# Patient Record
Sex: MALE | Race: BLACK OR AFRICAN AMERICAN | NOT HISPANIC OR LATINO | Employment: FULL TIME | URBAN - METROPOLITAN AREA
[De-identification: names, ages, dates, MRNs, and addresses within clinical notes are randomized per-mention and may not be internally consistent; named-entity substitution may affect disease eponyms.]

---

## 2019-12-09 ENCOUNTER — APPOINTMENT (OUTPATIENT)
Dept: CT IMAGING | Facility: HOSPITAL | Age: 29
End: 2019-12-09

## 2019-12-09 ENCOUNTER — HOSPITAL ENCOUNTER (EMERGENCY)
Facility: HOSPITAL | Age: 29
Discharge: HOME | End: 2019-12-09
Attending: EMERGENCY MEDICINE | Admitting: EMERGENCY MEDICINE

## 2019-12-09 VITALS
DIASTOLIC BLOOD PRESSURE: 74 MMHG | HEIGHT: 66 IN | HEART RATE: 79 BPM | OXYGEN SATURATION: 92 % | RESPIRATION RATE: 18 BRPM | SYSTOLIC BLOOD PRESSURE: 122 MMHG | TEMPERATURE: 97.2 F

## 2019-12-09 DIAGNOSIS — R51.9 ACUTE NONINTRACTABLE HEADACHE, UNSPECIFIED HEADACHE TYPE: Primary | ICD-10-CM

## 2019-12-09 PROCEDURE — 70450 CT HEAD/BRAIN W/O DYE: CPT

## 2019-12-09 PROCEDURE — 99283 EMERGENCY DEPT VISIT LOW MDM: CPT

## 2019-12-09 RX ORDER — ONDANSETRON 4 MG/1
4 TABLET, ORALLY DISINTEGRATING ORAL ONCE
Status: DISCONTINUED | OUTPATIENT
Start: 2019-12-09 | End: 2019-12-09 | Stop reason: HOSPADM

## 2019-12-09 NOTE — ED PROVIDER NOTES
EMERGENCY DEPARTMENT ENCOUNTER    CHIEF COMPLAINT  Chief Complaint: HA  History given by: pt  History limited by: nothing  Time Seen: 1217  Room Number: 41/41  PMD: Provider, No Known      HPI:  Pt is a 29 y.o. male who presents with c/o intermittent, worsening, severe headache, that is concentrated in the back of his head and behind his eyes, starting 3 days ago.  Patient also complains of fatigue.  Patient denies cough, fever, neck pain, neck stiffness, nausea, vomiting, or photophobia. Patient had some relief with Tylenol. Also denies ever having HA's like this before. Pt was seen at [facility] earlier today, and instructed to come to ED for further evaluation.  Past Medical History of head injury 4 years ago.    PAST MEDICAL HISTORY  Active Ambulatory Problems     Diagnosis Date Noted   • No Active Ambulatory Problems     Resolved Ambulatory Problems     Diagnosis Date Noted   • No Resolved Ambulatory Problems     No Additional Past Medical History       PAST SURGICAL HISTORY  No past surgical history on file.    FAMILY HISTORY  No family history on file.    SOCIAL HISTORY  Social History     Socioeconomic History   • Marital status: Single     Spouse name: Not on file   • Number of children: Not on file   • Years of education: Not on file   • Highest education level: Not on file         ALLERGIES  Patient has no known allergies.    REVIEW OF SYSTEMS  Review of Systems   Constitutional: Positive for fatigue. Negative for chills and fever.   HENT: Negative for sore throat.    Eyes: Negative for photophobia.   Respiratory: Negative for cough and shortness of breath.    Cardiovascular: Negative for chest pain.   Gastrointestinal: Negative for nausea and vomiting.   Genitourinary: Negative for dysuria.   Musculoskeletal: Negative for back pain, neck pain and neck stiffness.   Skin: Negative for rash.   Neurological: Positive for headaches. Negative for dizziness.   Psychiatric/Behavioral: The patient is not  nervous/anxious.        PHYSICAL EXAM  ED Triage Vitals   Temp Heart Rate Resp BP SpO2   12/09/19 1101 12/09/19 1101 12/09/19 1101 12/09/19 1134 12/09/19 1101   96.9 °F (36.1 °C) 86 18 131/76 96 %       Physical Exam   Constitutional: He is well-developed, well-nourished, and in no distress. No distress.   HENT:   Head: Atraumatic.   Mouth/Throat: Mucous membranes are normal.   No maxillary or sinus tenderness   Eyes: Pupils are equal, round, and reactive to light. No scleral icterus.   Neck: Normal range of motion.   Cardiovascular: Normal rate, regular rhythm and normal heart sounds.   Pulmonary/Chest: Effort normal and breath sounds normal.   Musculoskeletal: Normal range of motion.   Neurological: He is alert. He has normal strength and normal reflexes. He displays no weakness.   Skin: Skin is warm and dry.   Psychiatric: Mood and affect normal.   Nursing note and vitals reviewed.    RADIOLOGY  CT Head Without Contrast   Preliminary Result   1. No acute intracranial process identified.   2. Maxillary sinusitis.               Radiation dose reduction techniques were utilized, including automated   exposure control and exposure modulation based on body size.                  I ordered the above noted radiological studies and reviewed the images on the PACS system.      MEDICAL RECORD REVIEW    Pt was seen at [facility] prior to arrival here today, for further evaluation of sudden onset severe headache.    PROGRESS AND CONSULTS     1218- HR 82. O2 sat 98% on RA. /76.  Discussed with patient and family plan to obtain imaging of head. He declines pain medicine at this time. Pt understands and agrees with the plan, all questions answered. Ordered CT Head for further evaluation.     1333- Reviewed pt's history and workup with Dr. Trotter.  At bedside evaluation, they agree with the plan of care. He discussed with pt plan for discharge home.    Reviewed implications of results, diagnosis, meds, responsibility to  "follow up, warning signs and symptoms of possible worsening, potential complications and reasons to return to ER with patient.  Discussed all results and noted any abnormalities with patient.  Discussed absolute need to recheck abnormalities with PCP.    Discussed plan for discharge, as there is no emergent indication for admission.  Pt is agreeable and understands need for follow up and repeat testing.  Pt is aware that discharge does not mean that nothing is wrong but it indicates no emergency is present.  Pt is discharged with instructions to follow up with primary care doctor to have their blood pressure rechecked.       DIAGNOSIS  Final diagnoses:   Acute nonintractable headache, unspecified headache type       FOLLOW UP   PATIENT LIAISON [facility] [id number]  [phone]  Schedule an appointment as soon as possible for a visit   As needed        COURSE & MEDICAL DECISION MAKING  Pertinent Imaging studies that were ordered and reviewed are noted above.  Results were reviewed/discussed with the patient and they were also made aware of online assess.     MEDICATIONS GIVEN IN ER  Medications - No data to display    /89   Pulse 79   Temp 96.9 °F (36.1 °C) (Tympanic)   Resp 16   Ht 167.6 cm (66\")   SpO2 95%       I personally reviewed the past medical history, past surgical history, social history, family history, current medications and allergies as they appear in this chart.  The scribe's note accurately reflects the work and decisions made by me.     Documentation assistance provided by sai Judd for ZULEMA Thornton on 12/9/2019 at 12:12 PM. Information recorded by the scribe was done at my direction and has been verified and validated by me.     Shayla Judd  12/09/19 5326       Bertha Montoya APRN  12/09/19 1711    "

## 2019-12-09 NOTE — DISCHARGE INSTRUCTIONS
Tylenol or Motrin as needed for headache  Rest and increase fluids  Follow up with pmd or clinic of choice as needed  Return to er for fever, chills, vomiting, visual changes, worsening headache or any new or worsening symptoms

## 2019-12-09 NOTE — ED NOTES
Pt was at [facility] today for headache x 2 days and was told to come to ER for CT.      Sandra Flores RN  12/09/19 3458

## 2019-12-10 NOTE — ED PROVIDER NOTES
MD ATTESTATION NOTE    The DEMIAN and I have discussed this patient's history, physical exam, and treatment plan.  I have reviewed the documentation and personally had a face to face interaction with the patient. I affirm the documentation and agree with the treatment and plan.  The attached note describes my personal findings.      Zeeshan Gardner is a 29 y.o. male who presents to the ED c/o headache.  It is a frontal headache that radiates across the top of his head to the back.  Is been ongoing for 3 days.  It is pressure in character.  Denies any fever.  No photophobia or phonophobia.  He states this is a different headache for him.  It was gradual in onset.      On exam:  Recent and remote memory functions are normal. The patient is attentive with normal concentration. Language is fluent. Speech is clear. The speech is non-dysarthric. Fund of knowledge is normal.   Symmetric smile with no facial droop.  Eyes close shut strongly bilaterally.  Symmetric eyebrow raise bilaterally.  EOMI, PERRL  CN II-XII grossly normal otherwise.  5/5 strength to extremities.  No pronator drift.  Intact FNF.  No meningismus.         Labs  No results found for this or any previous visit (from the past 24 hour(s)).    Radiology  Ct Head Without Contrast    Result Date: 12/10/2019  CT OF THE BRAIN WITHOUT CONTRAST 12/09/2019  HISTORY: Headache.  Axial images were obtained through the brain without intravenous contrast.  The brain parenchyma and ventricular system appear within normal limits. No mass lesions, midline shift, intracranial hemorrhage or evidence of infarction is demonstrated.  There is mucosal thickening in the left maxillary sinus and very minimal mucosal thickening in the right maxillary sinus.  No bony abnormalities are seen.      1. No acute intracranial process identified. 2. Maxillary sinusitis.    Radiation dose reduction techniques were utilized, including automated exposure control and exposure modulation based on  body size.  This report was finalized on 12/10/2019 7:44 AM by Dr. Man Kim M.D.        Medical Decision Making:       Patient is in bed resting comfortably interacting with his family.  This does not appear to be consistent with a subarachnoid hemorrhage as it was not sudden onset and it is not the worst headache of his life.  However, he does state that it is different for him.  CT head is negative.  I do not believe that lumbar puncture is clinically indicated at this time in this patient as the risks would likely outweigh the likelihood of finding anything.  Additionally, low suspicion for meningitis in this patient as he has no meningismus and is afebrile.  He states that his headache is much improved.  I gave him good return precautions.  He will take Tylenol and ibuprofen over-the-counter.    Diagnosis  Final diagnoses:   Acute nonintractable headache, unspecified headache type        Inderjit Trotter II, MD  12/10/19 0755

## 2020-08-21 ENCOUNTER — OFFICE VISIT (OUTPATIENT)
Dept: FAMILY MEDICINE CLINIC | Facility: CLINIC | Age: 30
End: 2020-08-21

## 2020-08-21 VITALS
BODY MASS INDEX: 30.08 KG/M2 | DIASTOLIC BLOOD PRESSURE: 82 MMHG | SYSTOLIC BLOOD PRESSURE: 132 MMHG | WEIGHT: 187.2 LBS | RESPIRATION RATE: 16 BRPM | TEMPERATURE: 97.7 F | HEART RATE: 100 BPM | OXYGEN SATURATION: 97 % | HEIGHT: 66 IN

## 2020-08-21 DIAGNOSIS — Z00.00 ANNUAL PHYSICAL EXAM: Primary | ICD-10-CM

## 2020-08-21 DIAGNOSIS — G47.9 SLEEP DISTURBANCE: ICD-10-CM

## 2020-08-21 DIAGNOSIS — B36.0 PITYRIASIS VERSICOLOR: ICD-10-CM

## 2020-08-21 DIAGNOSIS — S46.819A STRAIN OF TRAPEZIUS MUSCLE, UNSPECIFIED LATERALITY, INITIAL ENCOUNTER: ICD-10-CM

## 2020-08-21 PROCEDURE — 99385 PREV VISIT NEW AGE 18-39: CPT | Performed by: NURSE PRACTITIONER

## 2020-08-21 RX ORDER — MELOXICAM 7.5 MG/1
7.5 TABLET ORAL DAILY
Qty: 14 TABLET | Refills: 0 | Status: SHIPPED | OUTPATIENT
Start: 2020-08-21

## 2020-08-21 RX ORDER — NYSTATIN AND TRIAMCINOLONE ACETONIDE 100000; 1 [USP'U]/G; MG/G
OINTMENT TOPICAL 2 TIMES DAILY
Qty: 1 BOTTLE | Refills: 1 | Status: SHIPPED | OUTPATIENT
Start: 2020-08-21

## 2020-08-21 NOTE — PATIENT INSTRUCTIONS
Increase water intake--goal 64 oz a day  Continue to eat heart healthy diet, increase fiber intake  Increase exercise to 3-5 times a week, for more than 30 minutes at a time  Need to make an appt and see optometrist for vision screening    Sleep-melatonin 5 mg every night    Trapezius strain: May use cold compress/ice pack 10-15 minutes at a time several times a day; May use warm compress/heating pad 10-15 minutes at at time several times a day;  May use over the counter biofreeze as needed (wash off from skin before using heating pad;  May use meloxicam script to help pain
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
## 2020-08-27 DIAGNOSIS — Z13.220 ENCOUNTER FOR SCREENING FOR LIPOID DISORDERS: ICD-10-CM

## 2020-08-27 DIAGNOSIS — Z13.89 ENCOUNTER FOR SCREENING FOR OTHER DISORDER: Primary | ICD-10-CM

## 2020-08-27 DIAGNOSIS — Z11.59 NEED FOR HEPATITIS C SCREENING TEST: ICD-10-CM

## 2020-08-27 DIAGNOSIS — Z00.00 ROUTINE HEALTH MAINTENANCE: ICD-10-CM

## 2020-09-14 LAB
ALBUMIN SERPL-MCNC: 4.6 G/DL (ref 3.5–5.2)
ALBUMIN/GLOB SERPL: 1.6 G/DL
ALP SERPL-CCNC: 77 U/L (ref 39–117)
ALT SERPL-CCNC: 29 U/L (ref 1–41)
APPEARANCE UR: CLEAR
AST SERPL-CCNC: 21 U/L (ref 1–40)
BILIRUB SERPL-MCNC: 0.4 MG/DL (ref 0–1.2)
BILIRUB UR QL STRIP: NEGATIVE
BUN SERPL-MCNC: 12 MG/DL (ref 6–20)
BUN/CREAT SERPL: 13.5 (ref 7–25)
CALCIUM SERPL-MCNC: 8.9 MG/DL (ref 8.6–10.5)
CHLORIDE SERPL-SCNC: 106 MMOL/L (ref 98–107)
CHOLEST SERPL-MCNC: 209 MG/DL (ref 0–200)
CO2 SERPL-SCNC: 24 MMOL/L (ref 22–29)
COLOR UR: YELLOW
CREAT SERPL-MCNC: 0.89 MG/DL (ref 0.76–1.27)
DIFFERENTIAL COMMENT: ABNORMAL
EOSINOPHIL # BLD MANUAL: 0.09 10*3/MM3 (ref 0–0.4)
EOSINOPHIL NFR BLD MANUAL: 2 % (ref 0.3–6.2)
ERYTHROCYTE [DISTWIDTH] IN BLOOD BY AUTOMATED COUNT: 12.4 % (ref 12.3–15.4)
GLOBULIN SER CALC-MCNC: 2.8 GM/DL
GLUCOSE SERPL-MCNC: 101 MG/DL (ref 65–99)
GLUCOSE UR QL: NEGATIVE
HCT VFR BLD AUTO: 42.3 % (ref 37.5–51)
HCV AB S/CO SERPL IA: 0.2 S/CO RATIO (ref 0–0.9)
HCV AB SERPL QL IA: NORMAL
HDLC SERPL-MCNC: 59 MG/DL (ref 40–60)
HGB BLD-MCNC: 13.9 G/DL (ref 13–17.7)
HGB UR QL STRIP: NEGATIVE
KETONES UR QL STRIP: NEGATIVE
LDLC SERPL CALC-MCNC: 135 MG/DL (ref 0–100)
LDLC/HDLC SERPL: 2.28 {RATIO}
LEUKOCYTE ESTERASE UR QL STRIP: NEGATIVE
LYMPHOCYTES # BLD MANUAL: 2.12 10*3/MM3 (ref 0.7–3.1)
LYMPHOCYTES NFR BLD MANUAL: 45.9 % (ref 19.6–45.3)
MCH RBC QN AUTO: 29.3 PG (ref 26.6–33)
MCHC RBC AUTO-ENTMCNC: 32.9 G/DL (ref 31.5–35.7)
MCV RBC AUTO: 89.2 FL (ref 79–97)
MONOCYTES # BLD MANUAL: 0.61 10*3/MM3 (ref 0.1–0.9)
MONOCYTES NFR BLD MANUAL: 13.3 % (ref 5–12)
NEUTROPHILS # BLD MANUAL: 1.79 10*3/MM3 (ref 1.7–7)
NEUTROPHILS NFR BLD MANUAL: 38.8 % (ref 42.7–76)
NITRITE UR QL STRIP: NEGATIVE
NRBC BLD AUTO-RTO: 0 /100 WBC (ref 0–0.2)
PH UR STRIP: 5.5 [PH] (ref 5–8)
PLATELET # BLD AUTO: 152 10*3/MM3 (ref 140–450)
PLATELET BLD QL SMEAR: ABNORMAL
POTASSIUM SERPL-SCNC: 4.2 MMOL/L (ref 3.5–5.2)
PROT SERPL-MCNC: 7.4 G/DL (ref 6–8.5)
PROT UR QL STRIP: NEGATIVE
RBC # BLD AUTO: 4.74 10*6/MM3 (ref 4.14–5.8)
RBC MORPH BLD: ABNORMAL
SODIUM SERPL-SCNC: 136 MMOL/L (ref 136–145)
SP GR UR: 1.02 (ref 1–1.03)
TRIGL SERPL-MCNC: 77 MG/DL (ref 0–150)
UROBILINOGEN UR STRIP-MCNC: NORMAL MG/DL
VLDLC SERPL CALC-MCNC: 15.4 MG/DL
WBC # BLD AUTO: 4.62 10*3/MM3 (ref 3.4–10.8)

## 2022-03-01 ENCOUNTER — APPOINTMENT (OUTPATIENT)
Dept: GENERAL RADIOLOGY | Facility: HOSPITAL | Age: 32
End: 2022-03-01

## 2022-03-01 ENCOUNTER — HOSPITAL ENCOUNTER (OUTPATIENT)
Facility: HOSPITAL | Age: 32
Setting detail: OBSERVATION
Discharge: HOME | End: 2022-03-07
Attending: EMERGENCY MEDICINE | Admitting: INTERNAL MEDICINE

## 2022-03-01 DIAGNOSIS — Z86.19 HISTORY OF VIRAL HEPATITIS: ICD-10-CM

## 2022-03-01 DIAGNOSIS — D69.6 THROMBOCYTOPENIA: ICD-10-CM

## 2022-03-01 DIAGNOSIS — R74.01 TRANSAMINITIS: Primary | ICD-10-CM

## 2022-03-01 LAB
ALBUMIN SERPL-MCNC: 4 G/DL (ref 3.5–5.2)
ALBUMIN/GLOB SERPL: 1.1 G/DL
ALP SERPL-CCNC: 189 U/L (ref 39–117)
ALT SERPL W P-5'-P-CCNC: 743 U/L (ref 1–41)
ANION GAP SERPL CALCULATED.3IONS-SCNC: 9.5 MMOL/L (ref 5–15)
AST SERPL-CCNC: 562 U/L (ref 1–40)
BASOPHILS # BLD AUTO: 0.01 10*3/MM3 (ref 0–0.2)
BASOPHILS NFR BLD AUTO: 0.2 % (ref 0–1.5)
BILIRUB SERPL-MCNC: 1 MG/DL (ref 0–1.2)
BILIRUB UR QL STRIP: NEGATIVE
BUN SERPL-MCNC: 11 MG/DL (ref 6–20)
BUN/CREAT SERPL: 11.3 (ref 7–25)
CALCIUM SPEC-SCNC: 9 MG/DL (ref 8.6–10.5)
CHLORIDE SERPL-SCNC: 104 MMOL/L (ref 98–107)
CLARITY UR: CLEAR
CO2 SERPL-SCNC: 23.5 MMOL/L (ref 22–29)
COLOR UR: YELLOW
CREAT SERPL-MCNC: 0.97 MG/DL (ref 0.76–1.27)
DEPRECATED RDW RBC AUTO: 41.7 FL (ref 37–54)
EGFRCR SERPLBLD CKD-EPI 2021: 105.7 ML/MIN/1.73
EOSINOPHIL # BLD AUTO: 0.07 10*3/MM3 (ref 0–0.4)
EOSINOPHIL NFR BLD AUTO: 1.6 % (ref 0.3–6.2)
ERYTHROCYTE [DISTWIDTH] IN BLOOD BY AUTOMATED COUNT: 13 % (ref 12.3–15.4)
GLOBULIN UR ELPH-MCNC: 3.5 GM/DL
GLUCOSE SERPL-MCNC: 120 MG/DL (ref 65–99)
GLUCOSE UR STRIP-MCNC: NEGATIVE MG/DL
HCT VFR BLD AUTO: 41.5 % (ref 37.5–51)
HGB BLD-MCNC: 14 G/DL (ref 13–17.7)
HGB UR QL STRIP.AUTO: NEGATIVE
KETONES UR QL STRIP: NEGATIVE
LEUKOCYTE ESTERASE UR QL STRIP.AUTO: NEGATIVE
LYMPHOCYTES # BLD AUTO: 2.27 10*3/MM3 (ref 0.7–3.1)
LYMPHOCYTES NFR BLD AUTO: 50.7 % (ref 19.6–45.3)
MCH RBC QN AUTO: 29.9 PG (ref 26.6–33)
MCHC RBC AUTO-ENTMCNC: 33.7 G/DL (ref 31.5–35.7)
MCV RBC AUTO: 88.5 FL (ref 79–97)
MONOCYTES # BLD AUTO: 0.69 10*3/MM3 (ref 0.1–0.9)
MONOCYTES NFR BLD AUTO: 15.4 % (ref 5–12)
NEUTROPHILS NFR BLD AUTO: 1.43 10*3/MM3 (ref 1.7–7)
NEUTROPHILS NFR BLD AUTO: 31.9 % (ref 42.7–76)
NITRITE UR QL STRIP: NEGATIVE
PH UR STRIP.AUTO: 6.5 [PH] (ref 5–8)
PLATELET # BLD AUTO: 130 10*3/MM3 (ref 140–450)
PMV BLD AUTO: 11.9 FL (ref 6–12)
POTASSIUM SERPL-SCNC: 3.7 MMOL/L (ref 3.5–5.2)
PROT SERPL-MCNC: 7.5 G/DL (ref 6–8.5)
PROT UR QL STRIP: NEGATIVE
RBC # BLD AUTO: 4.69 10*6/MM3 (ref 4.14–5.8)
SARS-COV-2 RNA PNL SPEC NAA+PROBE: NOT DETECTED
SODIUM SERPL-SCNC: 137 MMOL/L (ref 136–145)
SP GR UR STRIP: 1.01 (ref 1–1.03)
TROPONIN T SERPL-MCNC: <0.01 NG/ML (ref 0–0.03)
UROBILINOGEN UR QL STRIP: NORMAL
WBC NRBC COR # BLD: 4.48 10*3/MM3 (ref 3.4–10.8)

## 2022-03-01 PROCEDURE — 80074 ACUTE HEPATITIS PANEL: CPT | Performed by: EMERGENCY MEDICINE

## 2022-03-01 PROCEDURE — 93005 ELECTROCARDIOGRAM TRACING: CPT | Performed by: PHYSICIAN ASSISTANT

## 2022-03-01 PROCEDURE — 25010000002 ONDANSETRON PER 1 MG: Performed by: PHYSICIAN ASSISTANT

## 2022-03-01 PROCEDURE — 85025 COMPLETE CBC W/AUTO DIFF WBC: CPT | Performed by: PHYSICIAN ASSISTANT

## 2022-03-01 PROCEDURE — C9803 HOPD COVID-19 SPEC COLLECT: HCPCS

## 2022-03-01 PROCEDURE — 80143 DRUG ASSAY ACETAMINOPHEN: CPT | Performed by: EMERGENCY MEDICINE

## 2022-03-01 PROCEDURE — 71045 X-RAY EXAM CHEST 1 VIEW: CPT

## 2022-03-01 PROCEDURE — 96375 TX/PRO/DX INJ NEW DRUG ADDON: CPT

## 2022-03-01 PROCEDURE — 99284 EMERGENCY DEPT VISIT MOD MDM: CPT

## 2022-03-01 PROCEDURE — 87635 SARS-COV-2 COVID-19 AMP PRB: CPT | Performed by: PHYSICIAN ASSISTANT

## 2022-03-01 PROCEDURE — 84484 ASSAY OF TROPONIN QUANT: CPT | Performed by: PHYSICIAN ASSISTANT

## 2022-03-01 PROCEDURE — 96374 THER/PROPH/DIAG INJ IV PUSH: CPT

## 2022-03-01 PROCEDURE — 80053 COMPREHEN METABOLIC PANEL: CPT | Performed by: PHYSICIAN ASSISTANT

## 2022-03-01 PROCEDURE — 25010000002 KETOROLAC TROMETHAMINE PER 15 MG: Performed by: PHYSICIAN ASSISTANT

## 2022-03-01 PROCEDURE — 93010 ELECTROCARDIOGRAM REPORT: CPT | Performed by: INTERNAL MEDICINE

## 2022-03-01 PROCEDURE — 85610 PROTHROMBIN TIME: CPT | Performed by: EMERGENCY MEDICINE

## 2022-03-01 PROCEDURE — 86140 C-REACTIVE PROTEIN: CPT | Performed by: PHYSICIAN ASSISTANT

## 2022-03-01 PROCEDURE — 81003 URINALYSIS AUTO W/O SCOPE: CPT | Performed by: PHYSICIAN ASSISTANT

## 2022-03-01 RX ORDER — SODIUM CHLORIDE 0.9 % (FLUSH) 0.9 %
10 SYRINGE (ML) INJECTION AS NEEDED
Status: DISCONTINUED | OUTPATIENT
Start: 2022-03-01 | End: 2022-03-07 | Stop reason: HOSPADM

## 2022-03-01 RX ORDER — KETOROLAC TROMETHAMINE 15 MG/ML
15 INJECTION, SOLUTION INTRAMUSCULAR; INTRAVENOUS ONCE
Status: COMPLETED | OUTPATIENT
Start: 2022-03-01 | End: 2022-03-01

## 2022-03-01 RX ORDER — ONDANSETRON 2 MG/ML
4 INJECTION INTRAMUSCULAR; INTRAVENOUS ONCE
Status: COMPLETED | OUTPATIENT
Start: 2022-03-01 | End: 2022-03-01

## 2022-03-01 RX ADMIN — SODIUM CHLORIDE 1000 ML: 9 INJECTION, SOLUTION INTRAVENOUS at 22:30

## 2022-03-01 RX ADMIN — ONDANSETRON 4 MG: 2 INJECTION INTRAMUSCULAR; INTRAVENOUS at 22:09

## 2022-03-01 RX ADMIN — KETOROLAC TROMETHAMINE 15 MG: 15 INJECTION, SOLUTION INTRAMUSCULAR; INTRAVENOUS at 22:09

## 2022-03-02 ENCOUNTER — APPOINTMENT (OUTPATIENT)
Dept: CT IMAGING | Facility: HOSPITAL | Age: 32
End: 2022-03-02

## 2022-03-02 PROBLEM — D69.6 THROMBOCYTOPENIA (HCC): Status: ACTIVE | Noted: 2022-03-02

## 2022-03-02 PROBLEM — Z86.19 HISTORY OF VIRAL HEPATITIS: Status: ACTIVE | Noted: 2022-03-02

## 2022-03-02 PROBLEM — R74.01 TRANSAMINITIS: Status: ACTIVE | Noted: 2022-03-02

## 2022-03-02 LAB
ADV 40+41 DNA STL QL NAA+NON-PROBE: NOT DETECTED
ALBUMIN SERPL-MCNC: 3.6 G/DL (ref 3.5–5.2)
ALBUMIN/GLOB SERPL: 0.9 G/DL
ALP SERPL-CCNC: 182 U/L (ref 39–117)
ALT SERPL W P-5'-P-CCNC: 756 U/L (ref 1–41)
ANION GAP SERPL CALCULATED.3IONS-SCNC: 10.3 MMOL/L (ref 5–15)
APAP SERPL-MCNC: <5 MCG/ML (ref 0–30)
AST SERPL-CCNC: 567 U/L (ref 1–40)
ASTRO TYP 1-8 RNA STL QL NAA+NON-PROBE: NOT DETECTED
BASOPHILS # BLD AUTO: 0.01 10*3/MM3 (ref 0–0.2)
BASOPHILS NFR BLD AUTO: 0.3 % (ref 0–1.5)
BILIRUB SERPL-MCNC: 1.5 MG/DL (ref 0–1.2)
BUN SERPL-MCNC: 8 MG/DL (ref 6–20)
BUN/CREAT SERPL: 9.5 (ref 7–25)
C CAYETANENSIS DNA STL QL NAA+NON-PROBE: NOT DETECTED
C COLI+JEJ+UPSA DNA STL QL NAA+NON-PROBE: NOT DETECTED
CALCIUM SPEC-SCNC: 9 MG/DL (ref 8.6–10.5)
CHLORIDE SERPL-SCNC: 103 MMOL/L (ref 98–107)
CO2 SERPL-SCNC: 22.7 MMOL/L (ref 22–29)
CREAT SERPL-MCNC: 0.84 MG/DL (ref 0.76–1.27)
CRP SERPL-MCNC: 1.73 MG/DL (ref 0–0.5)
CRYPTOSP DNA STL QL NAA+NON-PROBE: NOT DETECTED
DEPRECATED RDW RBC AUTO: 42.8 FL (ref 37–54)
E HISTOLYT DNA STL QL NAA+NON-PROBE: NOT DETECTED
EAEC PAA PLAS AGGR+AATA ST NAA+NON-PRB: NOT DETECTED
EC STX1+STX2 GENES STL QL NAA+NON-PROBE: NOT DETECTED
EGFRCR SERPLBLD CKD-EPI 2021: 118.1 ML/MIN/1.73
EOSINOPHIL # BLD AUTO: 0.04 10*3/MM3 (ref 0–0.4)
EOSINOPHIL NFR BLD AUTO: 1.1 % (ref 0.3–6.2)
EPEC EAE GENE STL QL NAA+NON-PROBE: DETECTED
ERYTHROCYTE [DISTWIDTH] IN BLOOD BY AUTOMATED COUNT: 13.2 % (ref 12.3–15.4)
ETEC LTA+ST1A+ST1B TOX ST NAA+NON-PROBE: NOT DETECTED
G LAMBLIA DNA STL QL NAA+NON-PROBE: NOT DETECTED
GLOBULIN UR ELPH-MCNC: 3.9 GM/DL
GLUCOSE SERPL-MCNC: 101 MG/DL (ref 65–99)
HAV IGM SERPL QL IA: ABNORMAL
HBA1C MFR BLD: 5.8 % (ref 4.8–5.6)
HBV CORE IGM SERPL QL IA: REACTIVE
HBV SURFACE AG SERPL QL IA: ABNORMAL
HCT VFR BLD AUTO: 41.9 % (ref 37.5–51)
HCV AB SER DONR QL: ABNORMAL
HGB BLD-MCNC: 13.8 G/DL (ref 13–17.7)
HIV1+2 AB SER QL: NORMAL
INR PPP: 1 (ref 0.9–1.1)
INR PPP: 1.01 (ref 0.9–1.1)
LYMPHOCYTES # BLD AUTO: 1.53 10*3/MM3 (ref 0.7–3.1)
LYMPHOCYTES NFR BLD AUTO: 43.7 % (ref 19.6–45.3)
MCH RBC QN AUTO: 29.4 PG (ref 26.6–33)
MCHC RBC AUTO-ENTMCNC: 32.9 G/DL (ref 31.5–35.7)
MCV RBC AUTO: 89.3 FL (ref 79–97)
MONOCYTES # BLD AUTO: 0.64 10*3/MM3 (ref 0.1–0.9)
MONOCYTES NFR BLD AUTO: 18.3 % (ref 5–12)
NEUTROPHILS NFR BLD AUTO: 1.27 10*3/MM3 (ref 1.7–7)
NEUTROPHILS NFR BLD AUTO: 36.3 % (ref 42.7–76)
NOROVIRUS GI+II RNA STL QL NAA+NON-PROBE: NOT DETECTED
P SHIGELLOIDES DNA STL QL NAA+NON-PROBE: NOT DETECTED
PLATELET # BLD AUTO: 118 10*3/MM3 (ref 140–450)
PMV BLD AUTO: 11.8 FL (ref 6–12)
POTASSIUM SERPL-SCNC: 4.1 MMOL/L (ref 3.5–5.2)
PROT SERPL-MCNC: 7.5 G/DL (ref 6–8.5)
PROTHROMBIN TIME: 13.1 SECONDS (ref 11.7–14.2)
PROTHROMBIN TIME: 13.2 SECONDS (ref 11.7–14.2)
QT INTERVAL: 392 MS
RBC # BLD AUTO: 4.69 10*6/MM3 (ref 4.14–5.8)
RVA RNA STL QL NAA+NON-PROBE: NOT DETECTED
S ENT+BONG DNA STL QL NAA+NON-PROBE: NOT DETECTED
SAPO I+II+IV+V RNA STL QL NAA+NON-PROBE: NOT DETECTED
SHIGELLA SP+EIEC IPAH ST NAA+NON-PROBE: NOT DETECTED
SODIUM SERPL-SCNC: 136 MMOL/L (ref 136–145)
V CHOL+PARA+VUL DNA STL QL NAA+NON-PROBE: NOT DETECTED
V CHOLERAE DNA STL QL NAA+NON-PROBE: NOT DETECTED
WBC NRBC COR # BLD: 3.5 10*3/MM3 (ref 3.4–10.8)
Y ENTEROCOL DNA STL QL NAA+NON-PROBE: NOT DETECTED

## 2022-03-02 PROCEDURE — 74177 CT ABD & PELVIS W/CONTRAST: CPT

## 2022-03-02 PROCEDURE — 83036 HEMOGLOBIN GLYCOSYLATED A1C: CPT | Performed by: NURSE PRACTITIONER

## 2022-03-02 PROCEDURE — 96361 HYDRATE IV INFUSION ADD-ON: CPT

## 2022-03-02 PROCEDURE — G0378 HOSPITAL OBSERVATION PER HR: HCPCS

## 2022-03-02 PROCEDURE — 25010000002 IOPAMIDOL 61 % SOLUTION: Performed by: HOSPITALIST

## 2022-03-02 PROCEDURE — 85025 COMPLETE CBC W/AUTO DIFF WBC: CPT | Performed by: NURSE PRACTITIONER

## 2022-03-02 PROCEDURE — 85610 PROTHROMBIN TIME: CPT | Performed by: NURSE PRACTITIONER

## 2022-03-02 PROCEDURE — 99204 OFFICE O/P NEW MOD 45 MIN: CPT | Performed by: INTERNAL MEDICINE

## 2022-03-02 PROCEDURE — 25010000002 MORPHINE PER 10 MG: Performed by: NURSE PRACTITIONER

## 2022-03-02 PROCEDURE — 80053 COMPREHEN METABOLIC PANEL: CPT | Performed by: NURSE PRACTITIONER

## 2022-03-02 PROCEDURE — 87517 HEPATITIS B DNA QUANT: CPT | Performed by: INTERNAL MEDICINE

## 2022-03-02 PROCEDURE — 87040 BLOOD CULTURE FOR BACTERIA: CPT | Performed by: HOSPITALIST

## 2022-03-02 PROCEDURE — 96375 TX/PRO/DX INJ NEW DRUG ADDON: CPT

## 2022-03-02 PROCEDURE — 0097U HC BIOFIRE FILMARRAY GI PANEL: CPT | Performed by: NURSE PRACTITIONER

## 2022-03-02 PROCEDURE — 36415 COLL VENOUS BLD VENIPUNCTURE: CPT | Performed by: NURSE PRACTITIONER

## 2022-03-02 PROCEDURE — G0432 EIA HIV-1/HIV-2 SCREEN: HCPCS | Performed by: INTERNAL MEDICINE

## 2022-03-02 PROCEDURE — 87340 HEPATITIS B SURFACE AG IA: CPT | Performed by: EMERGENCY MEDICINE

## 2022-03-02 PROCEDURE — 87341 HEP B SURFACE AG NEUTRLZJ IA: CPT | Performed by: EMERGENCY MEDICINE

## 2022-03-02 RX ORDER — HYDROXYZINE HYDROCHLORIDE 25 MG/1
25 TABLET, FILM COATED ORAL 3 TIMES DAILY PRN
Status: DISCONTINUED | OUTPATIENT
Start: 2022-03-02 | End: 2022-03-07 | Stop reason: HOSPADM

## 2022-03-02 RX ORDER — ONDANSETRON 2 MG/ML
4 INJECTION INTRAMUSCULAR; INTRAVENOUS EVERY 6 HOURS PRN
Status: DISCONTINUED | OUTPATIENT
Start: 2022-03-02 | End: 2022-03-07 | Stop reason: HOSPADM

## 2022-03-02 RX ORDER — SODIUM CHLORIDE 9 MG/ML
100 INJECTION, SOLUTION INTRAVENOUS CONTINUOUS
Status: DISCONTINUED | OUTPATIENT
Start: 2022-03-02 | End: 2022-03-05

## 2022-03-02 RX ORDER — IBUPROFEN 600 MG/1
600 TABLET ORAL EVERY 4 HOURS PRN
Status: DISCONTINUED | OUTPATIENT
Start: 2022-03-02 | End: 2022-03-07 | Stop reason: HOSPADM

## 2022-03-02 RX ORDER — ACETAMINOPHEN 325 MG/1
650 TABLET ORAL EVERY 4 HOURS PRN
Status: DISCONTINUED | OUTPATIENT
Start: 2022-03-02 | End: 2022-03-07 | Stop reason: HOSPADM

## 2022-03-02 RX ORDER — DOCUSATE SODIUM 100 MG/1
100 CAPSULE, LIQUID FILLED ORAL 2 TIMES DAILY
Status: DISCONTINUED | OUTPATIENT
Start: 2022-03-02 | End: 2022-03-07 | Stop reason: HOSPADM

## 2022-03-02 RX ORDER — ACETAMINOPHEN 650 MG/1
650 SUPPOSITORY RECTAL EVERY 4 HOURS PRN
Status: DISCONTINUED | OUTPATIENT
Start: 2022-03-02 | End: 2022-03-07 | Stop reason: HOSPADM

## 2022-03-02 RX ORDER — CHOLECALCIFEROL (VITAMIN D3) 125 MCG
5 CAPSULE ORAL NIGHTLY PRN
Status: DISCONTINUED | OUTPATIENT
Start: 2022-03-02 | End: 2022-03-07 | Stop reason: HOSPADM

## 2022-03-02 RX ORDER — ACETAMINOPHEN 160 MG/5ML
650 SOLUTION ORAL EVERY 4 HOURS PRN
Status: DISCONTINUED | OUTPATIENT
Start: 2022-03-02 | End: 2022-03-07 | Stop reason: HOSPADM

## 2022-03-02 RX ORDER — SODIUM CHLORIDE 0.9 % (FLUSH) 0.9 %
10 SYRINGE (ML) INJECTION AS NEEDED
Status: DISCONTINUED | OUTPATIENT
Start: 2022-03-02 | End: 2022-03-07 | Stop reason: HOSPADM

## 2022-03-02 RX ORDER — SODIUM CHLORIDE 0.9 % (FLUSH) 0.9 %
10 SYRINGE (ML) INJECTION EVERY 12 HOURS SCHEDULED
Status: DISCONTINUED | OUTPATIENT
Start: 2022-03-02 | End: 2022-03-07 | Stop reason: HOSPADM

## 2022-03-02 RX ORDER — MORPHINE SULFATE 2 MG/ML
2 INJECTION, SOLUTION INTRAMUSCULAR; INTRAVENOUS EVERY 4 HOURS PRN
Status: DISCONTINUED | OUTPATIENT
Start: 2022-03-02 | End: 2022-03-07 | Stop reason: HOSPADM

## 2022-03-02 RX ADMIN — ACETAMINOPHEN 650 MG: 325 TABLET, FILM COATED ORAL at 08:04

## 2022-03-02 RX ADMIN — Medication 10 ML: at 21:02

## 2022-03-02 RX ADMIN — Medication 10 ML: at 01:43

## 2022-03-02 RX ADMIN — IOPAMIDOL 85 ML: 612 INJECTION, SOLUTION INTRAVENOUS at 12:19

## 2022-03-02 RX ADMIN — ACETAMINOPHEN 650 MG: 325 TABLET, FILM COATED ORAL at 18:29

## 2022-03-02 RX ADMIN — SODIUM CHLORIDE 100 ML/HR: 9 INJECTION, SOLUTION INTRAVENOUS at 01:44

## 2022-03-02 RX ADMIN — Medication 5 MG: at 01:43

## 2022-03-02 RX ADMIN — DOCUSATE SODIUM 100 MG: 100 CAPSULE, LIQUID FILLED ORAL at 08:10

## 2022-03-02 RX ADMIN — HYDROXYZINE HYDROCHLORIDE 25 MG: 25 TABLET ORAL at 21:08

## 2022-03-02 RX ADMIN — DOCUSATE SODIUM 100 MG: 100 CAPSULE, LIQUID FILLED ORAL at 01:43

## 2022-03-02 RX ADMIN — MORPHINE SULFATE 2 MG: 2 INJECTION, SOLUTION INTRAMUSCULAR; INTRAVENOUS at 21:02

## 2022-03-02 NOTE — ED PROVIDER NOTES
EMERGENCY DEPARTMENT ENCOUNTER    Room Number:  04/04  Date of encounter:  3/2/2022  PCP: Bertha Valencia APRN  Historian: Patient      HPI:  Chief Complaint: Headache       Context: Zeeshan Gardner is a 31 y.o. male with past medical history of hepatitis who presents to the ED c/o pain.  Patient states that he has had pain in the head, neck, ribs, and chest, as well as chills for the last few days.  Symptoms started 3 to 4 days ago, denies any known sick contacts.  States that he has had decreased appetite and dark-colored urine.  He denies any dysuria, hematuria, measured fever, nausea, vomiting, diarrhea, or abdominal pain.  Also denies any sore throat, runny nose, or cough.      PAST MEDICAL HISTORY  Active Ambulatory Problems     Diagnosis Date Noted   • No Active Ambulatory Problems     Resolved Ambulatory Problems     Diagnosis Date Noted   • No Resolved Ambulatory Problems     Past Medical History:   Diagnosis Date   • Headache    • Infectious viral hepatitis          PAST SURGICAL HISTORY  No past surgical history on file.      FAMILY HISTORY  No family history on file.      SOCIAL HISTORY  Social History     Socioeconomic History   • Marital status: Single   Tobacco Use   • Smoking status: Never Smoker   • Smokeless tobacco: Never Used   Substance and Sexual Activity   • Alcohol use: Yes     Comment: ocassionally, beer/wine, once a week   • Drug use: Never         ALLERGIES  Patient has no known allergies.        REVIEW OF SYSTEMS  Review of Systems     All systems reviewed and negative except for those discussed in HPI.       PHYSICAL EXAM    I have reviewed the triage vital signs and nursing notes.    ED Triage Vitals [03/01/22 2122]   Temp Heart Rate Resp BP SpO2   97.7 °F (36.5 °C) 109 16 -- 96 %      Temp src Heart Rate Source Patient Position BP Location FiO2 (%)   -- -- -- -- --       Physical Exam  GENERAL: Alert and oriented x3, not distressed  HENT: moist mucous membranes, no pharyngeal  erythema or tonsillar exudate, head atraumatic/normocephalic, no meningeal signs   EYES: no scleral icterus, PERRL, EOMI  CV: regular rhythm, regular rate  RESPIRATORY: normal effort  ABDOMEN: soft/nontender  MUSCULOSKELETAL: no deformity  NEURO: alert, moves all extremities, follows commands  SKIN: warm, dry, lipoma to the right upper back        LAB RESULTS  Recent Results (from the past 24 hour(s))   ECG 12 Lead    Collection Time: 03/01/22  9:55 PM   Result Value Ref Range    QT Interval 392 ms   Comprehensive Metabolic Panel    Collection Time: 03/01/22 10:08 PM    Specimen: Blood   Result Value Ref Range    Glucose 120 (H) 65 - 99 mg/dL    BUN 11 6 - 20 mg/dL    Creatinine 0.97 0.76 - 1.27 mg/dL    Sodium 137 136 - 145 mmol/L    Potassium 3.7 3.5 - 5.2 mmol/L    Chloride 104 98 - 107 mmol/L    CO2 23.5 22.0 - 29.0 mmol/L    Calcium 9.0 8.6 - 10.5 mg/dL    Total Protein 7.5 6.0 - 8.5 g/dL    Albumin 4.00 3.50 - 5.20 g/dL    ALT (SGPT) 743 (H) 1 - 41 U/L    AST (SGOT) 562 (H) 1 - 40 U/L    Alkaline Phosphatase 189 (H) 39 - 117 U/L    Total Bilirubin 1.0 0.0 - 1.2 mg/dL    Globulin 3.5 gm/dL    A/G Ratio 1.1 g/dL    BUN/Creatinine Ratio 11.3 7.0 - 25.0    Anion Gap 9.5 5.0 - 15.0 mmol/L    eGFR 105.7 >60.0 mL/min/1.73   Troponin    Collection Time: 03/01/22 10:08 PM    Specimen: Blood   Result Value Ref Range    Troponin T <0.010 0.000 - 0.030 ng/mL   C-reactive Protein    Collection Time: 03/01/22 10:08 PM    Specimen: Blood   Result Value Ref Range    C-Reactive Protein 1.73 (H) 0.00 - 0.50 mg/dL   CBC Auto Differential    Collection Time: 03/01/22 10:08 PM    Specimen: Blood   Result Value Ref Range    WBC 4.48 3.40 - 10.80 10*3/mm3    RBC 4.69 4.14 - 5.80 10*6/mm3    Hemoglobin 14.0 13.0 - 17.7 g/dL    Hematocrit 41.5 37.5 - 51.0 %    MCV 88.5 79.0 - 97.0 fL    MCH 29.9 26.6 - 33.0 pg    MCHC 33.7 31.5 - 35.7 g/dL    RDW 13.0 12.3 - 15.4 %    RDW-SD 41.7 37.0 - 54.0 fl    MPV 11.9 6.0 - 12.0 fL    Platelets  130 (L) 140 - 450 10*3/mm3    Neutrophil % 31.9 (L) 42.7 - 76.0 %    Lymphocyte % 50.7 (H) 19.6 - 45.3 %    Monocyte % 15.4 (H) 5.0 - 12.0 %    Eosinophil % 1.6 0.3 - 6.2 %    Basophil % 0.2 0.0 - 1.5 %    Neutrophils, Absolute 1.43 (L) 1.70 - 7.00 10*3/mm3    Lymphocytes, Absolute 2.27 0.70 - 3.10 10*3/mm3    Monocytes, Absolute 0.69 0.10 - 0.90 10*3/mm3    Eosinophils, Absolute 0.07 0.00 - 0.40 10*3/mm3    Basophils, Absolute 0.01 0.00 - 0.20 10*3/mm3   [facility] SWAB IN TRANSPORT MEDIA 8-12 HR TAT - Swab, Nasopharynx    Collection Time: 03/01/22 10:09 PM    Specimen: Nasopharynx; Swab   Result Value Ref Range    COVID19 Not Detected Not Detected - Ref. Range   Urinalysis With Microscopic If Indicated (No Culture) - Urine, Clean Catch    Collection Time: 03/01/22 10:31 PM    Specimen: Urine, Clean Catch   Result Value Ref Range    Color, UA Yellow Yellow, Straw    Appearance, UA Clear Clear    pH, UA 6.5 5.0 - 8.0    Specific Gravity, UA 1.015 1.005 - 1.030    Glucose, UA Negative Negative    Ketones, UA Negative Negative    Bilirubin, UA Negative Negative    Blood, UA Negative Negative    Protein, UA Negative Negative    Leuk Esterase, UA Negative Negative    Nitrite, UA Negative Negative    Urobilinogen, UA 1.0 E.U./dL 0.2 - 1.0 E.U./dL   Protime-INR    Collection Time: 03/01/22 11:43 PM    Specimen: Blood   Result Value Ref Range    Protime 13.1 11.7 - 14.2 Seconds    INR 1.00 0.90 - 1.10       Ordered the above labs and independently reviewed the results.        RADIOLOGY  XR Chest 1 View    Result Date: 3/1/2022  XR CHEST 1 VW-  03/01/2022  HISTORY: Chest pain.  Heart size is within normal limits. Lungs appear free of acute infiltrates. Bones and soft tissues are unremarkable.      1. No acute process.  This report was finalized on 3/1/2022 10:22 PM by Dr. Man Kim M.D.        I ordered the above noted radiological studies. Reviewed by me and discussed with radiologist.   See dictation for official radiology interpretation.      PROCEDURES    Procedures      MEDICATIONS GIVEN IN ER    Medications   sodium chloride 0.9 % flush 10 mL (has no administration in time range)   sodium chloride 0.9 % flush 10 mL (has no administration in time range)   sodium chloride 0.9 % flush 10 mL (has no administration in time range)   sodium chloride 0.9 % infusion (has no administration in time range)   acetaminophen (TYLENOL) tablet 650 mg (has no administration in time range)     Or   acetaminophen (TYLENOL) 160 MG/5ML solution 650 mg (has no administration in time range)     Or   acetaminophen (TYLENOL) suppository 650 mg (has no administration in time range)   ondansetron (ZOFRAN) injection 4 mg (has no administration in time range)   sodium chloride 0.9 % bolus 1,000 mL (1,000 mL Intravenous New Bag 3/1/22 2230)   ondansetron (ZOFRAN) injection 4 mg (4 mg Intravenous Given 3/1/22 2209)   ketorolac (TORADOL) injection 15 mg (15 mg Intravenous Given 3/1/22 2209)         PROGRESS, DATA ANALYSIS, CONSULTS, AND MEDICAL DECISION MAKING    All labs have been independently reviewed by me.  All radiology studies have been reviewed by me and discussed with radiologist dictating the report.   EKG's independently viewed and interpreted by me.  Discussion below represents my analysis of pertinent findings related to patient's condition, differential diagnosis, treatment plan and final disposition.    DDx: Includes but not limited to viral syndrome, Covid, pneumonia,    ED Course as of 03/02/22 0026   Tue Mar 01, 2022   2314 WBC: 4.48 [GEGE]   2314 Hemoglobin: 14.0 [GEGE]   2314 Hematocrit: 41.5 [GEGE]   2314 Platelets(!): 130 [GEGE]   2329 Sodium: 137 [GEGE]   2330 Potassium: 3.7 [GEGE]   2330 Chloride: 104 [GEGE]   2330 CO2: 23.5 [GEGE]   2330 BUN: 11 [GEGE]   2330 Creatinine: 0.97 [GEGE]   2330 Glucose(!): 120 [GEGE]   2330 Total Bilirubin: 1.0 [GEGE]   2330 ALT (SGPT)(!): 743 [GEGE]   2330 AST (SGOT)(!): 562 [GEGE]   2330 Alkaline  Phosphatase(!): 189 [GEGE]   2330 EKG          EKG time: 21:55  Rhythm/Rate: Sinus rhythm at 73 bpm  P waves and WV: Normal  QRS, axis: Normal  ST and T waves: ST elevation, probable normal early repolarization pattern, no acute ST/T wave changes    Interpreted Contemporaneously by me, independently viewed  No prior EKG for comparison.   [GEGE]   2344 Patient rechecked, resting comfortably in bed, no acute distress.  States that his headache and pains are improved after IV ketorolac and IV fluids.  Discussed the results of his lab including elevated LFTs and recommendation for admission for further evaluation.  Patient expresses understanding and agrees with plan for admission. [GEGE]   2344 Patient care discussed with Misty GONZALEZ, will place in observation to a [facility] bed per Dr. Glez. [GEGE]   Wed Mar 02, 2022   0025 C-Reactive Protein(!): 1.73 [GEGE]      ED Course User Index  [GEGE] Joel Perez PA       MDM: Patient has an acute elevation of his liver enzymes and a mild thrombocytopenia, will admit for further evaluation and GI consultation.    PPE: The patient wore a surgical mask throughout the entire patient encounter. I wore an N95.    AS OF 00:26 EST VITALS:    BP - 157/97  HR - 75  TEMP - 97.7 °F (36.5 °C)  O2 SATS - 95%        DIAGNOSIS  Final diagnoses:   Transaminitis   Thrombocytopenia (HCC)   History of viral hepatitis         DISPOSITION  ADMISSION    Discussed treatment plan and reason for admission with pt/family and admitting physician.  Pt/family voiced understanding of the plan for admission for further testing/treatment as needed.                  Joel Perez PA  03/02/22 0026

## 2022-03-02 NOTE — PLAN OF CARE
Problem: Adult Inpatient Plan of Care  Goal: Plan of Care Review  Outcome: Ongoing, Progressing  Flowsheets (Taken 3/2/2022 0415)  Progress: no change  Plan of Care Reviewed With: patient  Outcome Summary: patient arrived from ER with pain in left side of abdomen, recieved toradol and zofran in ER and a 1 Liter bolus. pt is very pleasant just rates pain a 6 which is states is better. complaint of having infrequent small slimy bowel movements. waiting on a stool sample. gave a stool softner and melatinin for sleeping tonight, clear liquid diet, up ad chitra. IV NS @ 100. will continue to monitor and treat per MD     Problem: Adult Inpatient Plan of Care  Goal: Patient-Specific Goal (Individualized)  Outcome: Ongoing, Progressing  Flowsheets (Taken 3/2/2022 0416)  Patient-Specific Goals (Include Timeframe): to figure out what is causing pain  Individualized Care Needs: PRN and scheduled meds, IV fluids, clear liquid diet, up ad chitra

## 2022-03-02 NOTE — ED NOTES
Pt unable to provide urine specimen at this time. This  tech encouraged patient to provide sample as soon as possible.     Mark Marquez  03/01/22 2024

## 2022-03-02 NOTE — ED PROVIDER NOTES
MD ATTESTATION NOTE    The DEMIAN and I have discussed this patient's history, physical exam, and treatment plan.    I provided a substantive portion of the care of this patient. I personally performed the physical exam, in its entirety. The attached note describes my personal findings.      Zeeshan Gardner is a 31 y.o. male who presents to the ED c/o headache.  Onset 3 to 4 days ago.  He also complains of having some pain to his left upper abdomen.      On exam:  GENERAL: not distressed  HENT: nares patent  EYES: no scleral icterus  CV: regular rhythm, regular rate  RESPIRATORY: normal effort  ABDOMEN: soft, nontender, no hepatosplenomegaly  MUSCULOSKELETAL: no deformity  NEURO: alert, moves all extremities, follows commands  SKIN: warm, dry    Labs  Recent Results (from the past 24 hour(s))   ECG 12 Lead    Collection Time: 03/01/22  9:55 PM   Result Value Ref Range    QT Interval 392 ms   Comprehensive Metabolic Panel    Collection Time: 03/01/22 10:08 PM    Specimen: Blood   Result Value Ref Range    Glucose 120 (H) 65 - 99 mg/dL    BUN 11 6 - 20 mg/dL    Creatinine 0.97 0.76 - 1.27 mg/dL    Sodium 137 136 - 145 mmol/L    Potassium 3.7 3.5 - 5.2 mmol/L    Chloride 104 98 - 107 mmol/L    CO2 23.5 22.0 - 29.0 mmol/L    Calcium 9.0 8.6 - 10.5 mg/dL    Total Protein 7.5 6.0 - 8.5 g/dL    Albumin 4.00 3.50 - 5.20 g/dL    ALT (SGPT) 743 (H) 1 - 41 U/L    AST (SGOT) 562 (H) 1 - 40 U/L    Alkaline Phosphatase 189 (H) 39 - 117 U/L    Total Bilirubin 1.0 0.0 - 1.2 mg/dL    Globulin 3.5 gm/dL    A/G Ratio 1.1 g/dL    BUN/Creatinine Ratio 11.3 7.0 - 25.0    Anion Gap 9.5 5.0 - 15.0 mmol/L    eGFR 105.7 >60.0 mL/min/1.73   Troponin    Collection Time: 03/01/22 10:08 PM    Specimen: Blood   Result Value Ref Range    Troponin T <0.010 0.000 - 0.030 ng/mL   C-reactive Protein    Collection Time: 03/01/22 10:08 PM    Specimen: Blood   Result Value Ref Range    C-Reactive Protein 1.73 (H) 0.00 - 0.50 mg/dL   CBC Auto Differential     Collection Time: 03/01/22 10:08 PM    Specimen: Blood   Result Value Ref Range    WBC 4.48 3.40 - 10.80 10*3/mm3    RBC 4.69 4.14 - 5.80 10*6/mm3    Hemoglobin 14.0 13.0 - 17.7 g/dL    Hematocrit 41.5 37.5 - 51.0 %    MCV 88.5 79.0 - 97.0 fL    MCH 29.9 26.6 - 33.0 pg    MCHC 33.7 31.5 - 35.7 g/dL    RDW 13.0 12.3 - 15.4 %    RDW-SD 41.7 37.0 - 54.0 fl    MPV 11.9 6.0 - 12.0 fL    Platelets 130 (L) 140 - 450 10*3/mm3    Neutrophil % 31.9 (L) 42.7 - 76.0 %    Lymphocyte % 50.7 (H) 19.6 - 45.3 %    Monocyte % 15.4 (H) 5.0 - 12.0 %    Eosinophil % 1.6 0.3 - 6.2 %    Basophil % 0.2 0.0 - 1.5 %    Neutrophils, Absolute 1.43 (L) 1.70 - 7.00 10*3/mm3    Lymphocytes, Absolute 2.27 0.70 - 3.10 10*3/mm3    Monocytes, Absolute 0.69 0.10 - 0.90 10*3/mm3    Eosinophils, Absolute 0.07 0.00 - 0.40 10*3/mm3    Basophils, Absolute 0.01 0.00 - 0.20 10*3/mm3   COVID-19, [facility] IN-HOUSE CEPHEID/BETTY NP SWAB IN TRANSPORT MEDIA 8-12 HR TAT - Swab, Nasopharynx    Collection Time: 03/01/22 10:09 PM    Specimen: Nasopharynx; Swab   Result Value Ref Range    COVID19 Not Detected Not Detected - Ref. Range   Urinalysis With Microscopic If Indicated (No Culture) - Urine, Clean Catch    Collection Time: 03/01/22 10:31 PM    Specimen: Urine, Clean Catch   Result Value Ref Range    Color, UA Yellow Yellow, Straw    Appearance, UA Clear Clear    pH, UA 6.5 5.0 - 8.0    Specific Gravity, UA 1.015 1.005 - 1.030    Glucose, UA Negative Negative    Ketones, UA Negative Negative    Bilirubin, UA Negative Negative    Blood, UA Negative Negative    Protein, UA Negative Negative    Leuk Esterase, UA Negative Negative    Nitrite, UA Negative Negative    Urobilinogen, UA 1.0 E.U./dL 0.2 - 1.0 E.U./dL   Protime-INR    Collection Time: 03/01/22 11:43 PM    Specimen: Blood   Result Value Ref Range    Protime 13.1 11.7 - 14.2 Seconds    INR 1.00 0.90 - 1.10       Radiology  XR Chest 1 View    Result Date: 3/1/2022  XR CHEST 1 VW-  03/01/2022  HISTORY: Chest  pain.  Heart size is within normal limits. Lungs appear free of acute infiltrates. Bones and soft tissues are unremarkable.      1. No acute process.  This report was finalized on 3/1/2022 10:22 PM by Dr. Man Kim M.D.        Medical Decision Making:  ED Course as of 03/02/22 0032 Tue Mar 01, 2022   2314 WBC: 4.48 [GEGE]   2314 Hemoglobin: 14.0 [GEGE]   2314 Hematocrit: 41.5 [GEGE]   2314 Platelets(!): 130 [GEGE]   2329 Sodium: 137 [GEGE]   2330 Potassium: 3.7 [GEGE]   2330 Chloride: 104 [GEGE]   2330 CO2: 23.5 [GEGE]   2330 BUN: 11 [GEGE]   2330 Creatinine: 0.97 [GEGE]   2330 Glucose(!): 120 [GEGE]   2330 Total Bilirubin: 1.0 [GEGE]   2330 ALT (SGPT)(!): 743 [GEGE]   2330 AST (SGOT)(!): 562 [GEGE]   2330 Alkaline Phosphatase(!): 189 [GEGE]   2330 EKG          EKG time: 21:55  Rhythm/Rate: Sinus rhythm at 73 bpm  P waves and ID: Normal  QRS, axis: Normal  ST and T waves: ST elevation, probable normal early repolarization pattern, no acute ST/T wave changes    Interpreted Contemporaneously by me, independently viewed  No prior EKG for comparison.   [GEGE]   2344 Patient rechecked, resting comfortably in bed, no acute distress.  States that his headache and pains are improved after IV ketorolac and IV fluids.  Discussed the results of his lab including elevated LFTs and recommendation for admission for further evaluation.  Patient expresses understanding and agrees with plan for admission. [GEGE]   2344 Patient care discussed with Msity GONZALEZ, will place in observation to a [facility] bed per Dr. Glez. [GEGE]   Wed Mar 02, 2022   0025 C-Reactive Protein(!): 1.73 [EGGE]      ED Course User Index  [GEGE] Joel Perez PA           PPE: Both the patient and I wore a surgical mask throughout the entire patient encounter. I wore protective goggles.     Diagnosis  Final diagnoses:   Transaminitis   Thrombocytopenia (HCC)   History of viral hepatitis        Inderjit Trotter II, MD  03/02/22 0033

## 2022-03-02 NOTE — H&P
Patient Name:  Zeeshan Gardner  YOB: 1990  MRN:  [id number]  Admit Date:  3/1/2022  Patient Care Team:  Bertha Valencia APRN as PCP - General (Family Medicine)      Subjective   History Present Illness     Chief Complaint   Patient presents with   • Headache   • Rib Pain     History of Present Illness   Mr. Gardner is a 31 year old male with history of headaches and infectious viral hepatitis who presents to the ED with complaints of generalized body aches and chills for the last few days.  Patient initially was complaining of headache, neck pain.  And rib pain.  He denies any fever but has had chills for last few days.  He denies any known sick contacts.  He states he has had some decreased appetite and dark-colored urine although has had no dysuria, blood in his urine, or any other urinary type symptoms.  He denies any abdominal pain nausea or vomiting.  He denies any recent fever, cough, shortness of breath, no exposure to COVID-19 that he is aware of.  He has been vaccinated against COVID-19.  Patient does state that he had an episode of viral hepatitis about 15 years ago when he initially moved here to [place] from [place] but has not had any issues since then.  In the emergency room patient's troponin was negative, glucose 120, sodium 137, potassium 3.7, creatinine 0.97, BUN 11, alkaline phosphatase elevated 189, ALT elevated at 743, AST elevated 562, CRP elevated at 1.73, INR normal 1.0, white blood cell count 4.48, hemoglobin 14.0, hematocrit 41.5, platelets low at 130, neutrophils low at 31.9, lymphocytes high at 50.7, monocytes high at 15.4, absolute neutrophils low at 1.43.    Review of Systems   Constitutional: Positive for appetite change (poor). Negative for fever.   HENT: Negative for nosebleeds and trouble swallowing.    Eyes: Negative for photophobia, redness and visual disturbance.   Respiratory: Negative for cough, chest tightness, shortness of breath and wheezing.     Cardiovascular: Negative for chest pain, palpitations and leg swelling.   Gastrointestinal: Negative for abdominal distention, abdominal pain, nausea and vomiting.   Endocrine: Negative.    Genitourinary: Negative.    Musculoskeletal: Positive for arthralgias and myalgias. Negative for gait problem and joint swelling.   Skin: Negative.    Neurological: Negative for dizziness, seizures, speech difficulty, light-headedness and headaches.   Hematological: Negative.    Psychiatric/Behavioral: Negative for behavioral problems and confusion.        Personal History     Past Medical History:   Diagnosis Date   • Headache    • Infectious viral hepatitis      No past surgical history on file.  No family history on file.  Social History     Tobacco Use   • Smoking status: Never Smoker   • Smokeless tobacco: Never Used   Substance Use Topics   • Alcohol use: Yes     Comment: ocassionally, beer/wine, once a week   • Drug use: Never     No current facility-administered medications on file prior to encounter.     Current Outpatient Medications on File Prior to Encounter   Medication Sig Dispense Refill   • meloxicam (Mobic) 7.5 MG tablet Take 1 tablet by mouth Daily. 14 tablet 0   • nystatin-triamcinolone (MYCOLOG) 285709-7.1 UNIT/GM-% ointment Apply  topically to the appropriate area as directed 2 (Two) Times a Day. Up to two weeks at a time 1 bottle 1     No Known Allergies    Objective    Objective     Vital Signs  Temp:  [97.7 °F (36.5 °C)-98.4 °F (36.9 °C)] 98.4 °F (36.9 °C)  Heart Rate:  [] 78  Resp:  [16] 16  BP: (146-157)/() 146/96  SpO2:  [95 %-100 %] 100 %  on   ;   Device (Oxygen Therapy): room air  Body mass index is 32.42 kg/m².    Physical Exam  Vitals and nursing note reviewed.   Constitutional:       General: He is not in acute distress.     Appearance: He is well-developed.   HENT:      Head: Normocephalic.   Neck:      Vascular: No JVD.   Cardiovascular:      Rate and Rhythm: Normal rate and  regular rhythm.      Heart sounds: Normal heart sounds.   Pulmonary:      Effort: Pulmonary effort is normal.      Breath sounds: Normal breath sounds.      Comments: Lung sounds clear, sats 90% room air  Abdominal:      General: Bowel sounds are normal. There is no distension.      Palpations: Abdomen is soft.      Tenderness: There is no abdominal tenderness.      Comments: No focal abdominal pain, some generalized achiness, no nausea at this time.   Musculoskeletal:         General: Normal range of motion.      Cervical back: Normal range of motion.      Right lower leg: No edema.      Left lower leg: No edema.   Skin:     General: Skin is warm and dry.      Capillary Refill: Capillary refill takes less than 2 seconds.   Neurological:      General: No focal deficit present.      Mental Status: He is alert and oriented to person, place, and time.   Psychiatric:         Attention and Perception: Attention normal.         Mood and Affect: Mood normal.         Speech: Speech normal.         Behavior: Behavior normal.         Cognition and Memory: Cognition normal.         Results Review:  I reviewed the patient's new clinical results.  I reviewed the patient's new imaging results and agree with the interpretation.  I reviewed the patient's other test results and agree with the interpretation  I personally viewed and interpreted the patient's EKG/Telemetry data  Discussed with ED provider.    Lab Results (last 24 hours)     Procedure Component Value Units Date/Time    CBC & Differential [[id number]]  (Abnormal) Collected: 03/01/22 2208    Specimen: Blood Updated: 03/01/22 2230    Narrative:      The following orders were created for panel order CBC & Differential.  Procedure                               Abnormality         Status                     ---------                               -----------         ------                     CBC Auto Differential[[id number]]        Abnormal            Final result                  Please view results for these tests on the individual orders.    Comprehensive Metabolic Panel [[id number]]  (Abnormal) Collected: 03/01/22 2208    Specimen: Blood Updated: 03/01/22 2320     Glucose 120 mg/dL      BUN 11 mg/dL      Creatinine 0.97 mg/dL      Sodium 137 mmol/L      Potassium 3.7 mmol/L      Chloride 104 mmol/L      CO2 23.5 mmol/L      Calcium 9.0 mg/dL      Total Protein 7.5 g/dL      Albumin 4.00 g/dL      ALT (SGPT) 743 U/L      AST (SGOT) 562 U/L      Alkaline Phosphatase 189 U/L      Total Bilirubin 1.0 mg/dL      Globulin 3.5 gm/dL      A/G Ratio 1.1 g/dL      BUN/Creatinine Ratio 11.3     Anion Gap 9.5 mmol/L      eGFR 105.7 mL/min/1.73      Comment: National Kidney Foundation and American Society of Nephrology (ASN) Task Force recommended calculation based on the Chronic Kidney Disease Epidemiology Collaboration (CKD-EPI) equation refit without adjustment for race.       Narrative:      GFR Normal >60  Chronic Kidney Disease <60  Kidney Failure <15      Troponin [[id number]]  (Normal) Collected: 03/01/22 2208    Specimen: Blood Updated: 03/01/22 2241     Troponin T <0.010 ng/mL     Narrative:      Troponin T Reference Range:  <= 0.03 ng/mL-   Negative for AMI  >0.03 ng/mL-     Abnormal for myocardial necrosis.  Clinicians would have to utilize clinical acumen, EKG, Troponin and serial changes to determine if it is an Acute Myocardial Infarction or myocardial injury due to an underlying chronic condition.       Results may be falsely decreased if patient taking Biotin.      C-reactive Protein [[id number]]  (Abnormal) Collected: 03/01/22 2208    Specimen: Blood Updated: 03/02/22 0013     C-Reactive Protein 1.73 mg/dL     CBC Auto Differential [[id number]]  (Abnormal) Collected: 03/01/22 2208    Specimen: Blood Updated: 03/01/22 2230     WBC 4.48 10*3/mm3      RBC 4.69 10*6/mm3      Hemoglobin 14.0 g/dL      Hematocrit 41.5 %      MCV 88.5 fL      MCH 29.9 pg      MCHC 33.7 g/dL      RDW 13.0 %       RDW-SD 41.7 fl      MPV 11.9 fL      Platelets 130 10*3/mm3      Neutrophil % 31.9 %      Lymphocyte % 50.7 %      Monocyte % 15.4 %      Eosinophil % 1.6 %      Basophil % 0.2 %      Neutrophils, Absolute 1.43 10*3/mm3      Lymphocytes, Absolute 2.27 10*3/mm3      Monocytes, Absolute 0.69 10*3/mm3      Eosinophils, Absolute 0.07 10*3/mm3      Basophils, Absolute 0.01 10*3/mm3     Acetaminophen Level [[id number]]  (Normal) Collected: 03/01/22 2208    Specimen: Blood Updated: 03/02/22 0054     Acetaminophen <5.0 mcg/mL     COVID PRE-OP / PRE-PROCEDURE SCREENING ORDER (NO ISOLATION) - Swab, Nasopharynx [[id number]]  (Normal) Collected: 03/01/22 2209    Specimen: Swab from Nasopharynx Updated: 03/01/22 2248    Narrative:      The following orders were created for panel order COVID PRE-OP / PRE-PROCEDURE SCREENING ORDER (NO ISOLATION) - Swab, Nasopharynx.  Procedure                               Abnormality         Status                     ---------                               -----------         ------                     COVID-19,[facility] IN-HOUSE...[[id number]]  Normal              Final result                 Please view results for these tests on the individual orders.    COVID-19,[facility] IN-HOUSE CEPHEID/BETTY NP SWAB IN TRANSPORT MEDIA 8-12 HR TAT - Swab, Nasopharynx [[id number]]  (Normal) Collected: 03/01/22 2209    Specimen: Swab from Nasopharynx Updated: 03/01/22 2248     COVID19 Not Detected    Narrative:      Fact sheet for providers: https://www.fda.gov/media/679495/download    Fact sheet for patients: https://www.fda.gov/media/674583/download    Test performed by PCR.    Urinalysis With Microscopic If Indicated (No Culture) - Urine, Clean Catch [[id number]]  (Normal) Collected: 03/01/22 2231    Specimen: Urine, Clean Catch Updated: 03/01/22 2240     Color, UA Yellow     Appearance, UA Clear     pH, UA 6.5     Specific Gravity, UA 1.015     Glucose, UA Negative     Ketones, UA Negative     Bilirubin, UA  Negative     Blood, UA Negative     Protein, UA Negative     Leuk Esterase, UA Negative     Nitrite, UA Negative     Urobilinogen, UA 1.0 E.U./dL    Narrative:      Urine microscopic not indicated.    Protime-INR [[id number]]  (Normal) Collected: 03/01/22 2343    Specimen: Blood Updated: 03/02/22 0002     Protime 13.1 Seconds      INR 1.00    Hepatitis Panel, Acute [[id number]] Collected: 03/01/22 2343    Specimen: Blood Updated: 03/01/22 2346          Imaging Results (Last 24 Hours)     Procedure Component Value Units Date/Time    XR Chest 1 View [[id number]] Collected: 03/01/22 2222     Updated: 03/01/22 2226    Narrative:      XR CHEST 1 VW-  03/01/2022     HISTORY: Chest pain.     Heart size is within normal limits. Lungs appear free of acute  infiltrates. Bones and soft tissues are unremarkable.       Impression:      1. No acute process.     This report was finalized on 3/1/2022 10:22 PM by Dr. Man Kim M.D.                 ECG 12 Lead   Preliminary Result   HEART RATE= 73  bpm   RR Interval= 816  ms   ND Interval= 117  ms   P Horizontal Axis= 51  deg   P Front Axis= 33  deg   QRSD Interval= 86  ms   QT Interval= 392  ms   QRS Axis= 9  deg   T Wave Axis= 36  deg   - NORMAL ECG -   Sinus rhythm   ST elev, probable normal early repol pattern   Electronically Signed By:    Date and Time of Study: 2022-03-01 21:55:52           Assessment/Plan     Active Hospital Problems    Diagnosis  POA   • **Transaminitis [R74.01]  Yes   • Thrombocytopenia (HCC) [D69.6]  Unknown   • History of viral hepatitis [Z86.19]  Unknown     Mr. Gardner is a 31 year old male with history of headaches and infectious viral hepatitis who presents to the ED with complaints of generalized body aches and chills for the last few days.    Transaminitis/thrombocytopenia  -Acute hepatitis panel pending  -Normal saline at 100cc/hr  -Consult gastroenterology  -CT abdomen with and without contrast  -Recheck CMP and CBC in a.m.  -Pain  control  -Check GI PCR, patient complaining of some mucousy stool but not diarrhea    · I discussed the patient's findings and my recommendations with patient and ED provider.    VTE Prophylaxis - SCDs.  Code Status - Full code.       LISA Freeman  [facility]  03/02/22  01:14 EST

## 2022-03-02 NOTE — CONSULTS
[facility]  Initial Inpatient Consult Note    Referring Provider: A     Reason for Consultation: elevated liver enzymes      Subjective     History of present illness:      Thank you for requesting my opinion.  This is a 31-year-old man, previously known to the GI service, admitted through the ER with generalized abdominal pain.  Gastroenterology is consulted for elevated liver enzymes.  He says symptoms started about 6 days ago.  He developed upper abdominal pain.  It was radiating into his back and right shoulder blade.  It is been associated with some nausea and anorexia.  Apparently he was having a little bit of a change in his stool habits and a GI PCR was checked positive for enteropathogenic E. coli though he currently is denying any diarrhea issues.  Transaminases are notable for elevated alkaline phosphatase in the 180s, ALT in the 700s, AST in the 500s, mildly elevated bilirubin.  His last labs were from September 2020 and were in normal range.  He denies any prior history of liver disease.  No family history of liver disease    He works at a warehouse    No smoking, no excess alcohol    Hepatitis B IgM is positive, negative hepatitis a, hepatitis B surface antigen has not resulted yet.    He denies any abdominal surgeries    Abdominal pain is improved.  He is complaining of headache    He has not had any imaging of his liver as yet but is going for a CT scan shortly      Past Medical History:  Past Medical History:   Diagnosis Date   • Headache    • Infectious viral hepatitis        Past Surgical History:  No past surgical history on file.     Social History:   Social History     Tobacco Use   • Smoking status: Never Smoker   • Smokeless tobacco: Never Used   Substance Use Topics   • Alcohol use: Yes     Comment: ocassionally, beer/wine, once a week        Family History:  No family history on file.    Home Meds:  Medications Prior to Admission   Medication Sig Dispense Refill  Last Dose   • meloxicam (Mobic) 7.5 MG tablet Take 1 tablet by mouth Daily. 14 tablet 0 Unknown at Unknown time   • nystatin-triamcinolone (MYCOLOG) 905737-5.1 UNIT/GM-% ointment Apply  topically to the appropriate area as directed 2 (Two) Times a Day. Up to two weeks at a time 1 bottle 1 Unknown at Unknown time       Current Meds:   docusate sodium, 100 mg, Oral, BID  sodium chloride, 10 mL, Intravenous, Q12H        Allergies:  No Known Allergies    Review of Systems  All systems were reviewed and negative except for:  Gastrointestinal: positive for  nausea and pain  Neurological: positive for  headaches     Objective     Vital Signs  Temp:  [97.5 °F (36.4 °C)-98.4 °F (36.9 °C)] 98 °F (36.7 °C)  Heart Rate:  [] 71  Resp:  [16] 16  BP: (123-157)/() 137/86    Physical Exam:  Constitutional:   Alert, cooperative, in no acute distress, appears stated age   Eyes:           Lids and lashes normal, conjunctivae and sclerae normal,   no icterus   Ears, nose, mouth and throat:  Normal appearance of external ears and nose, no oral l  lesions, no thrush, oral mucosa moist   Respiratory:    Clear to auscultation, respirations regular, even and             unlabored    Cardiovascular:   Regular rhythm and normal rate, normal S1 and S2, no        murmur, no gallop, palpable distal pulses, no lower extremity edema   Gastrointestinal:    Soft, nondistended, nontender to palpation, no guarding, no rebound tenderness, normal bowel sounds, no palpable masses or organomegaly  Rectal exam: deferred   Musculoskeletal:  Normal station, no atrophy, no tenderness to palpation, normal digits and nails   Skin:  Normal color, no bleeding, bruising, rashes or lesions   Lymphatics:  No palpable cervical or supraclavicular adenopathy   Psychiatric:  Judgement and insight: normal   Orientation to person, place and time: normal   Mood and affect: normal       Results Review:   I reviewed the patient's new clinical results.    Results  from last 7 days   Lab Units 03/02/22  0844 03/01/22  2208   WBC 10*3/mm3 3.50 4.48   HEMOGLOBIN g/dL 13.8 14.0   HEMATOCRIT % 41.9 41.5   PLATELETS 10*3/mm3 118* 130*       Results from last 7 days   Lab Units 03/02/22  0844 03/01/22  2208   SODIUM mmol/L 136 137   POTASSIUM mmol/L 4.1 3.7   CHLORIDE mmol/L 103 104   CO2 mmol/L 22.7 23.5   BUN mg/dL 8 11   CREATININE mg/dL 0.84 0.97   CALCIUM mg/dL 9.0 9.0   BILIRUBIN mg/dL 1.5* 1.0   ALK PHOS U/L 182* 189*   ALT (SGPT) U/L 756* 743*   AST (SGOT) U/L 567* 562*   GLUCOSE mg/dL 101* 120*       Results from last 7 days   Lab Units 03/02/22  0844 03/01/22  2343   INR  1.01 1.00       No results found for: LIPASE    Radiology:  Imaging Results (Last 72 Hours)     Procedure Component Value Units Date/Time    XR Chest 1 View [[id number]] Collected: 03/01/22 2222     Updated: 03/01/22 2226    Narrative:      XR CHEST 1 VW-  03/01/2022     HISTORY: Chest pain.     Heart size is within normal limits. Lungs appear free of acute  infiltrates. Bones and soft tissues are unremarkable.       Impression:      1. No acute process.     This report was finalized on 3/1/2022 10:22 PM by Dr. Man Kim M.D.             Assessment/Plan       Transaminitis    Thrombocytopenia (HCC)    History of viral hepatitis      Impression  1.  Elevated liver enzymes: Hepatocellular pattern.  Hepatitis B IgM is positive.?  Acute hepatitis B infection    2.  Positive hepatitis B IgM    3.  Thrombocytopenia    4.  Upper abdominal pain:?  Related to acute viral hepatitis versus biliary etiology    Plan  Follow-up hepatitis B surface antigen  Check hepatitis B viral load  Follow-up CT imaging  Continue supportive care measures, especially in the setting of acute hepatitis B  Check HIV serology    I discussed the patients findings and my recommendations with patient and nursing staff    All necessary PPE, including face mask and eye protection, were worn during this encounter.  Hand sanitization  was performed both before and after the patient interaction.    Amelia Moon MD  [facility]      Dictated utilizing Dragon dictation

## 2022-03-02 NOTE — CASE MANAGEMENT/SOCIAL WORK
Discharge Planning Assessment  [facility]     Patient Name: Zeeshan Gardner  MRN: [id number]  Today's Date: 3/2/2022    Admit Date: 3/1/2022     Discharge Needs Assessment     Row Name 03/02/22 1328       Living Environment    Current Living Arrangements home/apartment/condo    Primary Care Provided by self    Able to Return to Prior Arrangements yes       Transition Planning    Patient/Family Anticipates Transition to home    Patient/Family Anticipated Services at Transition none    Transportation Anticipated family or friend will provide       Discharge Needs Assessment    Equipment Currently Used at Home none    Concerns to be Addressed no discharge needs identified; denies needs/concerns at this time    Anticipated Changes Related to Illness none    Equipment Needed After Discharge none               Discharge Plan     Row Name 03/02/22 9092       Plan    Plan Plans are to return home.  CCP will follow for any needs.    Patient/Family in Agreement with Plan yes    Plan Comments CCP spoke with pt @ bedside, confirmed face sheet and primary pharmacy as [facility].  Pt states he is IDAL's// plans are home.  Pt denies any needs.  Niya CRISTOBAL RN/CCP              Continued Care and Services - Admitted Since 3/1/2022    Coordination has not been started for this encounter.       Expected Discharge Date and Time     Expected Discharge Date Expected Discharge Time    Mar 3, 2022          Demographic Summary    No documentation.                Functional Status     Row Name 03/02/22 1328       Functional Status    Usual Activity Tolerance excellent    Current Activity Tolerance good       Functional Status, IADL    Medications independent               Psychosocial    No documentation.                Abuse/Neglect    No documentation.                Legal    No documentation.                Substance Abuse    No documentation.                Patient Forms    No documentation.                   Niya  YULIANA Monk, RN

## 2022-03-02 NOTE — PLAN OF CARE
Goal Outcome Evaluation:  Plan of Care Reviewed With: patient        Progress: no change  Outcome Summary: VSS. Pt complained of a headache this morning, tylenol given. CT scan done this afternoon. Pt still has complaints of small slimy bowel movements. Diet advanced to regular. Stool positive for E.Coli. Pt has rested comfortably. No needs at this time.

## 2022-03-02 NOTE — ED TRIAGE NOTES
.Pt masked on arrival, staff masked    Pt reports multiple pain sites--left rib pain, migraine ha, neck pain and chills, started a few days ago

## 2022-03-03 PROBLEM — A04.4 E COLI ENTERITIS: Status: ACTIVE | Noted: 2022-03-03

## 2022-03-03 PROBLEM — I10 ESSENTIAL HYPERTENSION: Status: ACTIVE | Noted: 2022-03-03

## 2022-03-03 PROBLEM — R73.9 HYPERGLYCEMIA: Status: ACTIVE | Noted: 2022-03-03

## 2022-03-03 LAB
ALBUMIN SERPL-MCNC: 3.4 G/DL (ref 3.5–5.2)
ALBUMIN/GLOB SERPL: 1 G/DL
ALP SERPL-CCNC: 161 U/L (ref 39–117)
ALT SERPL W P-5'-P-CCNC: 716 U/L (ref 1–41)
ANION GAP SERPL CALCULATED.3IONS-SCNC: 10 MMOL/L (ref 5–15)
AST SERPL-CCNC: 582 U/L (ref 1–40)
BILIRUB SERPL-MCNC: 1.7 MG/DL (ref 0–1.2)
BUN SERPL-MCNC: 6 MG/DL (ref 6–20)
BUN/CREAT SERPL: 6.7 (ref 7–25)
CALCIUM SPEC-SCNC: 8.4 MG/DL (ref 8.6–10.5)
CHLORIDE SERPL-SCNC: 104 MMOL/L (ref 98–107)
CO2 SERPL-SCNC: 22 MMOL/L (ref 22–29)
CREAT SERPL-MCNC: 0.89 MG/DL (ref 0.76–1.27)
DEPRECATED RDW RBC AUTO: 44.5 FL (ref 37–54)
EGFRCR SERPLBLD CKD-EPI 2021: 116 ML/MIN/1.73
ERYTHROCYTE [DISTWIDTH] IN BLOOD BY AUTOMATED COUNT: 13.6 % (ref 12.3–15.4)
GLOBULIN UR ELPH-MCNC: 3.5 GM/DL
GLUCOSE SERPL-MCNC: 90 MG/DL (ref 65–99)
HBV DNA SERPL NAA+PROBE-ACNC: NORMAL IU/ML
HBV DNA SERPL NAA+PROBE-ACNC: NORMAL IU/ML
HBV DNA SERPL NAA+PROBE-LOG IU: 7.62 LOG10 IU/ML
HCT VFR BLD AUTO: 40.5 % (ref 37.5–51)
HGB BLD-MCNC: 13.5 G/DL (ref 13–17.7)
INR PPP: 1.02 (ref 0.9–1.1)
MCH RBC QN AUTO: 30.1 PG (ref 26.6–33)
MCHC RBC AUTO-ENTMCNC: 33.3 G/DL (ref 31.5–35.7)
MCV RBC AUTO: 90.2 FL (ref 79–97)
PLATELET # BLD AUTO: 136 10*3/MM3 (ref 140–450)
PMV BLD AUTO: 12.1 FL (ref 6–12)
POTASSIUM SERPL-SCNC: 3.6 MMOL/L (ref 3.5–5.2)
PROT SERPL-MCNC: 6.9 G/DL (ref 6–8.5)
PROTHROMBIN TIME: 13.3 SECONDS (ref 11.7–14.2)
RBC # BLD AUTO: 4.49 10*6/MM3 (ref 4.14–5.8)
REF LAB TEST REF RANGE: NORMAL
SODIUM SERPL-SCNC: 136 MMOL/L (ref 136–145)
WBC NRBC COR # BLD: 3.52 10*3/MM3 (ref 3.4–10.8)

## 2022-03-03 PROCEDURE — 85027 COMPLETE CBC AUTOMATED: CPT | Performed by: HOSPITALIST

## 2022-03-03 PROCEDURE — G0378 HOSPITAL OBSERVATION PER HR: HCPCS

## 2022-03-03 PROCEDURE — 87040 BLOOD CULTURE FOR BACTERIA: CPT | Performed by: HOSPITALIST

## 2022-03-03 PROCEDURE — 80053 COMPREHEN METABOLIC PANEL: CPT | Performed by: INTERNAL MEDICINE

## 2022-03-03 PROCEDURE — 85610 PROTHROMBIN TIME: CPT | Performed by: INTERNAL MEDICINE

## 2022-03-03 PROCEDURE — 99214 OFFICE O/P EST MOD 30 MIN: CPT | Performed by: INTERNAL MEDICINE

## 2022-03-03 PROCEDURE — 96361 HYDRATE IV INFUSION ADD-ON: CPT

## 2022-03-03 RX ORDER — AMLODIPINE BESYLATE 5 MG/1
5 TABLET ORAL
Status: DISCONTINUED | OUTPATIENT
Start: 2022-03-03 | End: 2022-03-07 | Stop reason: HOSPADM

## 2022-03-03 RX ORDER — LEVOFLOXACIN 750 MG/1
750 TABLET ORAL
Status: DISCONTINUED | OUTPATIENT
Start: 2022-03-03 | End: 2022-03-07 | Stop reason: HOSPADM

## 2022-03-03 RX ORDER — HYDRALAZINE HYDROCHLORIDE 20 MG/ML
10 INJECTION INTRAMUSCULAR; INTRAVENOUS EVERY 8 HOURS PRN
Status: DISCONTINUED | OUTPATIENT
Start: 2022-03-03 | End: 2022-03-07 | Stop reason: HOSPADM

## 2022-03-03 RX ADMIN — IBUPROFEN 600 MG: 600 TABLET, FILM COATED ORAL at 00:23

## 2022-03-03 RX ADMIN — SODIUM CHLORIDE 100 ML/HR: 9 INJECTION, SOLUTION INTRAVENOUS at 20:29

## 2022-03-03 RX ADMIN — AMLODIPINE BESYLATE 5 MG: 5 TABLET ORAL at 20:39

## 2022-03-03 RX ADMIN — Medication 5 MG: at 00:23

## 2022-03-03 RX ADMIN — LEVOFLOXACIN 750 MG: 750 TABLET, FILM COATED ORAL at 20:39

## 2022-03-03 RX ADMIN — Medication 10 ML: at 20:40

## 2022-03-03 RX ADMIN — IBUPROFEN 600 MG: 600 TABLET, FILM COATED ORAL at 14:26

## 2022-03-03 RX ADMIN — SODIUM CHLORIDE 100 ML/HR: 9 INJECTION, SOLUTION INTRAVENOUS at 00:24

## 2022-03-03 RX ADMIN — SODIUM CHLORIDE 100 ML/HR: 9 INJECTION, SOLUTION INTRAVENOUS at 09:31

## 2022-03-03 RX ADMIN — DOCUSATE SODIUM 100 MG: 100 CAPSULE, LIQUID FILLED ORAL at 20:29

## 2022-03-03 NOTE — PLAN OF CARE
Problem: Adult Inpatient Plan of Care  Goal: Plan of Care Review  Outcome: Ongoing, Progressing  Flowsheets (Taken 3/3/2022 0407)  Progress: no change  Plan of Care Reviewed With: patient  Outcome Summary: pt was running a fever of 101.6 gave tylenol, called to get IBU ordered to protect his liver. Gave morphine 2 mg x1 and tolerated well. Didn't ask for anything else pain wise. Fever has decreased. IV fluids NS @ 100. Reg diet. Blood cultures drawn. Will continue to monitor and treat per MD      Problem: Adult Inpatient Plan of Care  Goal: Patient-Specific Goal (Individualized)  Outcome: Ongoing, Progressing  Flowsheets (Taken 3/2/2022 0416)  Patient-Specific Goals (Include Timeframe): to figure out what is causing pain  Individualized Care Needs: PRN and scheduled meds, IV fluids, reg diet, up ad chitra

## 2022-03-03 NOTE — PLAN OF CARE
Goal Outcome Evaluation:  Plan of Care Reviewed With: patient        Progress: no change  Outcome Summary: VSS. Pt spiked at fever once throughout the shift, ibuprofen given. Pt has had no complaints of pain. Pt has napped throughout the shift. No needs at thie time

## 2022-03-03 NOTE — PROGRESS NOTES
[facility]  Inpatient Progress Note    Reason for Follow Up:  Hepatitis    Subjective     Interval History:   Transaminases are about the same.  Platelets have improved.  CT scan shows normal appearance of liver, bilateral renal cyst nonobstructing left kidney stone.  He says he feels better today.  No fevers, no malaise or fatigue.    Current Facility-Administered Medications:   •  acetaminophen (TYLENOL) tablet 650 mg, 650 mg, Oral, Q4H PRN, 650 mg at 03/02/22 1829 **OR** acetaminophen (TYLENOL) 160 MG/5ML solution 650 mg, 650 mg, Oral, Q4H PRN **OR** acetaminophen (TYLENOL) suppository 650 mg, 650 mg, Rectal, Q4H PRN, Misty Dooley APRN  •  docusate sodium (COLACE) capsule 100 mg, 100 mg, Oral, BID, Misty Dooley APRN, 100 mg at 03/02/22 0810  •  hydrALAZINE (APRESOLINE) injection 10 mg, 10 mg, Intravenous, Q8H PRN, Bhavesh Man MD  •  hydrOXYzine (ATARAX) tablet 25 mg, 25 mg, Oral, TID PRN, Donald Ramires MD, 25 mg at 03/02/22 2108  •  ibuprofen (ADVIL,MOTRIN) tablet 600 mg, 600 mg, Oral, Q4H PRN, Torres Jansen APRN, 600 mg at 03/03/22 1426  •  melatonin tablet 5 mg, 5 mg, Oral, Nightly PRN, Misty Dooley APRN, 5 mg at 03/03/22 0023  •  morphine injection 2 mg, 2 mg, Intravenous, Q4H PRN, Misty Dooley APRN, 2 mg at 03/02/22 2102  •  ondansetron (ZOFRAN) injection 4 mg, 4 mg, Intravenous, Q6H PRN, Misty Dooley APRN  •  [COMPLETED] Insert peripheral IV, , , Once **AND** sodium chloride 0.9 % flush 10 mL, 10 mL, Intravenous, PRN, Joel Perez PA  •  sodium chloride 0.9 % flush 10 mL, 10 mL, Intravenous, Q12H, Misty Dooley APRN, 10 mL at 03/02/22 2102  •  sodium chloride 0.9 % flush 10 mL, 10 mL, Intravenous, PRN, Misty Dooley APRN  •  sodium chloride 0.9 % infusion, 100 mL/hr, Intravenous, Continuous, Misty Dooley APRN, Last Rate: 100 mL/hr at 03/03/22 1408, 100 mL/hr at 03/03/22 1408  Review of  Systems:     The following systems were reviewed and negative: Constitution, pulmonary, cardiac, gastrointestinal, genitourinary    Objective     Vital Signs  Temp:  [98.9 °F (37.2 °C)-101.6 °F (38.7 °C)] 101.5 °F (38.6 °C)  Heart Rate:  [73-98] 73  Resp:  [16] 16  BP: (104-149)/() 145/88  Body mass index is 32.42 kg/m².    Intake/Output Summary (Last 24 hours) at 3/3/2022 1513  Last data filed at 3/3/2022 0649  Gross per 24 hour   Intake 3283.33 ml   Output 400 ml   Net 2883.33 ml     No intake/output data recorded.     Physical Exam:   General: patient awake, alert and cooperative   Eyes: Normal lids and lashes, no scleral icterus   Neck: supple, normal ROM   Skin: warm and dry, not jaundiced   Cardiovascular: regular rhythm and rate, no murmurs auscultated   Pulm: clear to auscultation bilaterally, regular and unlabored   Abdomen: soft, nontender, nondistended; normal bowel sounds   Rectal: deferred   Extremities: no rash or edema   Psychiatric: Normal mood and behavior; memory intact     Results Review:     I reviewed the patient's new clinical results.    Results from last 7 days   Lab Units 03/03/22 0108 03/02/22  0844 03/01/22  2208   WBC 10*3/mm3 3.52 3.50 4.48   HEMOGLOBIN g/dL 13.5 13.8 14.0   HEMATOCRIT % 40.5 41.9 41.5   PLATELETS 10*3/mm3 136* 118* 130*     Results from last 7 days   Lab Units 03/03/22 0108 03/02/22  0844 03/01/22  2208   SODIUM mmol/L 136 136 137   POTASSIUM mmol/L 3.6 4.1 3.7   CHLORIDE mmol/L 104 103 104   CO2 mmol/L 22.0 22.7 23.5   BUN mg/dL 6 8 11   CREATININE mg/dL 0.89 0.84 0.97   CALCIUM mg/dL 8.4* 9.0 9.0   BILIRUBIN mg/dL 1.7* 1.5* 1.0   ALK PHOS U/L 161* 182* 189*   ALT (SGPT) U/L 716* 756* 743*   AST (SGOT) U/L 582* 567* 562*   GLUCOSE mg/dL 90 101* 120*     Results from last 7 days   Lab Units 03/03/22  0108 03/02/22  0844 03/01/22  2343   INR  1.02 1.01 1.00     No results found for: LIPASE    Radiology:  CT Abdomen Pelvis With Contrast   Final Result   1. Normal  CT appearance of the liver.   2. Bilateral renal cysts   3. Tiny nonobstructing left kidney stone       Radiation dose reduction techniques were utilized, including automated   exposure control and exposure modulation based on body size.       This report was finalized on 3/2/2022 12:58 PM by Dr. Nasir Davis M.D.          XR Chest 1 View   Final Result   1. No acute process.       This report was finalized on 3/1/2022 10:22 PM by Dr. Man Kim M.D.              Assessment/Plan     Patient Active Problem List   Diagnosis   • Transaminitis   • Thrombocytopenia (HCC)   • History of viral hepatitis       Impression  1.  Elevated liver enzymes: Hepatocellular pattern.  HIV neg, awaiting HBV DNA    2.  Positive hepatitis B IgM    3.  Thrombocytopenia    4.  Viral syndrome    Plan  He tells me that he was diagnosed with hepatitis when he came to the [place].  He was on medication for short period of time but has not been on anything for years.  He said a doctor told him that he was cured.  September 2020 labs show normal transaminases.  I am concerned that this is reactivation of chronic hepatitis B.  I will check further serologic work-up to help confirm this along with hepatitis E and Delta, and AFP    If he is otherwise feeling well, anticipate discharge home soon with close follow-up with me in my office to determine treatment.    I discussed the patients findings and my recommendations with patient and family.    All necessary PPE, including face mask and eye protection, were worn during this encounter.  Hand sanitization was performed both before and after the patient interaction.    Over 25 minutes was spent reviewing the patient's chart and records, in discussion with the patient, in examination of the patient, and in discussion with members of the patient's medical team.    Amelia Moon MD

## 2022-03-03 NOTE — PROGRESS NOTES
Name: Zeeshan Gardner ADMIT: 3/1/2022   : 1990  PCP: Bertha Valencia APRN    MRN: [id number] LOS: 0 days   AGE/SEX: 31 y.o. male  ROOM: [facility]     Subjective   Subjective   Patient feels better.  The patient denies any fever or chills.  No nausea or vomiting.  Tolerating regular diet pleasant.  No bowel movements today.  No abdominal pain.    Review of Systems  .  No dysuria or hematuria.  Cardiovascular/respiratory.  No chest pain/no shortness of breath/no cough/no wheeze/no hemoptysis.  CNS.  No loss of consciousness.  No focal neurological symptoms.       Objective   Objective   Vital Signs  Temp:  [98.9 °F (37.2 °C)-101.6 °F (38.7 °C)] 101.5 °F (38.6 °C)  Heart Rate:  [73-98] 73  Resp:  [16] 16  BP: (104-149)/() 145/88  SpO2:  [94 %-98 %] 96 %  on   ;   Device (Oxygen Therapy): room air    Intake/Output Summary (Last 24 hours) at 3/3/2022 1813  Last data filed at 3/3/2022 0649  Gross per 24 hour   Intake 2803.33 ml   Output 400 ml   Net 2403.33 ml     Body mass index is 32.42 kg/m².      22  0053   Weight: 91.1 kg (200 lb 13.4 oz)     Physical Exam  General.  Middle-aged gentleman.  Alert and oriented x3.  No apparent pain/distress/diaphoresis.  Normal mood and affect.  Eyes.  Pupils equal round and reactive.  Intact extraocular musculature.  No pallor or jaundice.  Normal conjunctive and lids.    Oral cavity.  There are moist mucous membrane.  Neck.  Supple.  No JVD.  No lymphadenopathy or thyromegaly.  Cardiovascular.  Regular rate and rhythm.  No gallops or murmurs.  Chest.  Clear to auscultation bilaterally with no added sounds.  Abdomen.  Soft lax.  No tenderness.  No organomegaly.  No guarding or rebound.  Normal bowel sounds.  Mildly distended.  Extremities.  No clubbing cyanosis or edema.  CNS.  No acute focal neurological deficits.    Results Review:      Results from last 7 days   Lab Units 22  0108 22  0844 22  2208   SODIUM mmol/L 136 136 137   POTASSIUM  mmol/L 3.6 4.1 3.7   CHLORIDE mmol/L 104 103 104   CO2 mmol/L 22.0 22.7 23.5   BUN mg/dL 6 8 11   CREATININE mg/dL 0.89 0.84 0.97   GLUCOSE mg/dL 90 101* 120*   CALCIUM mg/dL 8.4* 9.0 9.0   AST (SGOT) U/L 582* 567* 562*   ALT (SGPT) U/L 716* 756* 743*     Estimated Creatinine Clearance: 124.7 mL/min (by C-G formula based on SCr of 0.89 mg/dL).  Results from last 7 days   Lab Units 03/02/22 0844   HEMOGLOBIN A1C % 5.80*         Results from last 7 days   Lab Units 03/01/22 2208   TROPONIN T ng/mL <0.010                   Invalid input(s):  PHOS        Invalid input(s): LDLCALC  Results from last 7 days   Lab Units 03/03/22 0108 03/02/22  0844 03/02/22 0844 03/01/22 2208 03/01/22 2208   WBC 10*3/mm3 3.52  --  3.50  --  4.48   HEMOGLOBIN g/dL 13.5  --  13.8  --  14.0   HEMATOCRIT % 40.5  --  41.9  --  41.5   PLATELETS 10*3/mm3 136*  --  118*  --  130*   MCV fL 90.2   < > 89.3   < > 88.5   MCH pg 30.1   < > 29.4   < > 29.9   MCHC g/dL 33.3   < > 32.9   < > 33.7   RDW % 13.6   < > 13.2   < > 13.0   RDW-SD fl 44.5   < > 42.8   < > 41.7   MPV fL 12.1*   < > 11.8   < > 11.9   NEUTROPHIL % %  --   --  36.3*   < > 31.9*   LYMPHOCYTE % %  --   --  43.7   < > 50.7*   MONOCYTES % %  --   --  18.3*   < > 15.4*   EOSINOPHIL % %  --   --  1.1   < > 1.6   BASOPHIL % %  --   --  0.3   < > 0.2   NEUTROS ABS 10*3/mm3  --   --  1.27*   < > 1.43*   LYMPHS ABS 10*3/mm3  --   --  1.53   < > 2.27   MONOS ABS 10*3/mm3  --   --  0.64   < > 0.69   EOS ABS 10*3/mm3  --   --  0.04   < > 0.07   BASOS ABS 10*3/mm3  --   --  0.01   < > 0.01    < > = values in this interval not displayed.     Results from last 7 days   Lab Units 03/03/22  0108 03/02/22  0844 03/01/22  2343   INR  1.02 1.01 1.00             Results from last 7 days   Lab Units 03/01/22  2208   CRP mg/dL 1.73*             Results from last 7 days   Lab Units 03/02/22  0657   ADENOVIRUS  Not Detected     Results from last 7 days   Lab Units 03/01/22  2231   NITRITE UA  Negative            Imaging:  Imaging Results (Last 24 Hours)     ** No results found for the last 24 hours. **             I reviewed the patient's new clinical results / labs / tests / procedures      Assessment/Plan     Active Hospital Problems    Diagnosis  POA   • **Transaminitis [R74.01]  Yes   • Thrombocytopenia (HCC) [D69.6]  Unknown   • History of viral hepatitis [Z86.19]  Unknown      Resolved Hospital Problems   No resolved problems to display.           · Acute hepatitis AB infection/E. coli enteritis/transaminitis.  CT scan of the abdomen pelvis is normal except nonobstructing left kidney stone.  GI PCR with E. coli.  Continues with fever.  Continues with elevation of the liver function test.  INR is normal.  GI consultation has done a full hepatitis work-up.  Blood cultures are pending.  HIV is negative.  Initiate Levaquin.  · Thrombocytopenia.  No bleeding diathesis.  Mostly secondary to viral infection.  Will observe.  · Glucose intolerance.  A1c 5.8.  Diet at discharge.  · Hypertension.  No evidence of angina or congestive heart failure.  Will initiate Norvasc and as needed hydralazine and monitor.  · VTE prophylaxis with sequential compression devices.    · Discussed with patient.      Bhavesh Man MD  [facility]  03/03/22  18:13 EST

## 2022-03-04 LAB
ALBUMIN SERPL-MCNC: 3.8 G/DL (ref 3.5–5.2)
ALBUMIN/GLOB SERPL: 1.1 G/DL
ALP SERPL-CCNC: 182 U/L (ref 39–117)
ALPHA-FETOPROTEIN: 26.66 NG/ML (ref 0–8.3)
ALT SERPL W P-5'-P-CCNC: 875 U/L (ref 1–41)
ANION GAP SERPL CALCULATED.3IONS-SCNC: 11.5 MMOL/L (ref 5–15)
AST SERPL-CCNC: 712 U/L (ref 1–40)
BILIRUB SERPL-MCNC: 1.8 MG/DL (ref 0–1.2)
BUN SERPL-MCNC: 7 MG/DL (ref 6–20)
BUN/CREAT SERPL: 8.1 (ref 7–25)
CALCIUM SPEC-SCNC: 8.7 MG/DL (ref 8.6–10.5)
CHLORIDE SERPL-SCNC: 103 MMOL/L (ref 98–107)
CO2 SERPL-SCNC: 23.5 MMOL/L (ref 22–29)
CREAT SERPL-MCNC: 0.86 MG/DL (ref 0.76–1.27)
DEPRECATED RDW RBC AUTO: 46.6 FL (ref 37–54)
EGFRCR SERPLBLD CKD-EPI 2021: 117.3 ML/MIN/1.73
EOSINOPHIL # BLD MANUAL: 0.07 10*3/MM3 (ref 0–0.4)
EOSINOPHIL NFR BLD MANUAL: 2.2 % (ref 0.3–6.2)
ERYTHROCYTE [DISTWIDTH] IN BLOOD BY AUTOMATED COUNT: 13.7 % (ref 12.3–15.4)
GLOBULIN UR ELPH-MCNC: 3.5 GM/DL
GLUCOSE SERPL-MCNC: 108 MG/DL (ref 65–99)
HBV SURFACE AB SER RIA-ACNC: NORMAL
HBV SURFACE AG SERPL QL IA: NORMAL
HBV SURFACE AG SERPL QL IA: NORMAL
HBV SURFACE AG SERPL QL NT: POSITIVE
HCT VFR BLD AUTO: 42.9 % (ref 37.5–51)
HGB BLD-MCNC: 14.1 G/DL (ref 13–17.7)
INR PPP: 0.99 (ref 0.9–1.1)
LYMPHOCYTES # BLD MANUAL: 1.06 10*3/MM3 (ref 0.7–3.1)
LYMPHOCYTES NFR BLD MANUAL: 18.3 % (ref 5–12)
MCH RBC QN AUTO: 30 PG (ref 26.6–33)
MCHC RBC AUTO-ENTMCNC: 32.9 G/DL (ref 31.5–35.7)
MCV RBC AUTO: 91.3 FL (ref 79–97)
MONOCYTES # BLD: 0.57 10*3/MM3 (ref 0.1–0.9)
NEUTROPHILS # BLD AUTO: 1.4 10*3/MM3 (ref 1.7–7)
NEUTROPHILS NFR BLD MANUAL: 45.2 % (ref 42.7–76)
PLAT MORPH BLD: NORMAL
PLATELET # BLD AUTO: 116 10*3/MM3 (ref 140–450)
PMV BLD AUTO: 12.1 FL (ref 6–12)
POTASSIUM SERPL-SCNC: 3.8 MMOL/L (ref 3.5–5.2)
PROT SERPL-MCNC: 7.3 G/DL (ref 6–8.5)
PROTHROMBIN TIME: 13 SECONDS (ref 11.7–14.2)
RBC # BLD AUTO: 4.7 10*6/MM3 (ref 4.14–5.8)
RBC MORPH BLD: NORMAL
SMUDGE CELLS BLD QL SMEAR: ABNORMAL
SODIUM SERPL-SCNC: 138 MMOL/L (ref 136–145)
VARIANT LYMPHS NFR BLD MANUAL: 34.4 % (ref 19.6–45.3)
WBC NRBC COR # BLD: 3.09 10*3/MM3 (ref 3.4–10.8)

## 2022-03-04 PROCEDURE — 87340 HEPATITIS B SURFACE AG IA: CPT | Performed by: INTERNAL MEDICINE

## 2022-03-04 PROCEDURE — 86790 VIRUS ANTIBODY NOS: CPT | Performed by: INTERNAL MEDICINE

## 2022-03-04 PROCEDURE — G0378 HOSPITAL OBSERVATION PER HR: HCPCS

## 2022-03-04 PROCEDURE — 87341 HEP B SURFACE AG NEUTRLZJ IA: CPT | Performed by: INTERNAL MEDICINE

## 2022-03-04 PROCEDURE — 96361 HYDRATE IV INFUSION ADD-ON: CPT

## 2022-03-04 PROCEDURE — 87350 HEPATITIS BE AG IA: CPT | Performed by: INTERNAL MEDICINE

## 2022-03-04 PROCEDURE — 80053 COMPREHEN METABOLIC PANEL: CPT | Performed by: INTERNAL MEDICINE

## 2022-03-04 PROCEDURE — 85610 PROTHROMBIN TIME: CPT | Performed by: INTERNAL MEDICINE

## 2022-03-04 PROCEDURE — 86706 HEP B SURFACE ANTIBODY: CPT | Performed by: INTERNAL MEDICINE

## 2022-03-04 PROCEDURE — 85025 COMPLETE CBC W/AUTO DIFF WBC: CPT | Performed by: INTERNAL MEDICINE

## 2022-03-04 PROCEDURE — 99214 OFFICE O/P EST MOD 30 MIN: CPT | Performed by: INTERNAL MEDICINE

## 2022-03-04 PROCEDURE — 85007 BL SMEAR W/DIFF WBC COUNT: CPT | Performed by: INTERNAL MEDICINE

## 2022-03-04 PROCEDURE — 86692 HEPATITIS DELTA AGENT ANTBDY: CPT | Performed by: INTERNAL MEDICINE

## 2022-03-04 PROCEDURE — 82105 ALPHA-FETOPROTEIN SERUM: CPT | Performed by: INTERNAL MEDICINE

## 2022-03-04 PROCEDURE — 86707 HEPATITIS BE ANTIBODY: CPT | Performed by: INTERNAL MEDICINE

## 2022-03-04 RX ADMIN — HYDROXYZINE HYDROCHLORIDE 25 MG: 25 TABLET ORAL at 05:33

## 2022-03-04 RX ADMIN — DOCUSATE SODIUM 100 MG: 100 CAPSULE, LIQUID FILLED ORAL at 21:20

## 2022-03-04 RX ADMIN — IBUPROFEN 600 MG: 600 TABLET, FILM COATED ORAL at 10:43

## 2022-03-04 RX ADMIN — IBUPROFEN 600 MG: 600 TABLET, FILM COATED ORAL at 21:20

## 2022-03-04 RX ADMIN — Medication 5 MG: at 21:19

## 2022-03-04 RX ADMIN — AMLODIPINE BESYLATE 5 MG: 5 TABLET ORAL at 08:36

## 2022-03-04 RX ADMIN — Medication 10 ML: at 08:37

## 2022-03-04 RX ADMIN — SODIUM CHLORIDE 100 ML/HR: 9 INJECTION, SOLUTION INTRAVENOUS at 06:56

## 2022-03-04 RX ADMIN — Medication 10 ML: at 21:23

## 2022-03-04 RX ADMIN — Medication 5 MG: at 00:29

## 2022-03-04 RX ADMIN — LEVOFLOXACIN 750 MG: 750 TABLET, FILM COATED ORAL at 08:36

## 2022-03-04 RX ADMIN — DOCUSATE SODIUM 100 MG: 100 CAPSULE, LIQUID FILLED ORAL at 08:37

## 2022-03-04 NOTE — PLAN OF CARE
Goal Outcome Evaluation:  Plan of Care Reviewed With: patient        Progress: no change  Outcome Summary: VSS. Pt only given one dose of ibuprofen for fever. Pt has had no complaints of pain. Blood  pressure has seemed to have stablized. No needs at this time

## 2022-03-04 NOTE — PLAN OF CARE
Goal Outcome Evaluation:  Plan of Care Reviewed With: patient        Progress: no change   Patient is alert and oriented times four. Up as tolerated in his room. Vital signs are stable. Melatonin given once for complaints of insomnia. No complaints of pain or discomfort. Will continue to monitor.

## 2022-03-04 NOTE — PROGRESS NOTES
Name: Zeeshan Gardner ADMIT: 3/1/2022   : 1990  PCP: Bertha Valencia APRN    MRN: [id number] LOS: 0 days   AGE/SEX: 31 y.o. male  ROOM: [facility]     Subjective   Subjective   Patient reports no complaints.  The patient denies any fever or chills.  No nausea or vomiting.  Tolerating regular diet pleasant.  No bowel movements last 2 days..  No abdominal pain.    Review of Systems    .  No dysuria or hematuria.  Cardiovascular/respiratory.  No chest pain/no shortness of breath/no cough/no wheeze/no hemoptysis.  CNS.  No loss of consciousness.  No focal neurological symptoms.       Objective   Objective   Vital Signs  Temp:  [98.4 °F (36.9 °C)-99.1 °F (37.3 °C)] 98.4 °F (36.9 °C)  Heart Rate:  [65-95] 95  Resp:  [16] 16  BP: (119-156)/() 135/92  SpO2:  [94 %-99 %] 99 %  on   ;   Device (Oxygen Therapy): room air    Intake/Output Summary (Last 24 hours) at 3/4/2022 1609  Last data filed at 3/4/2022 0526  Gross per 24 hour   Intake 880 ml   Output --   Net 880 ml     Body mass index is 32.42 kg/m².      22  0053   Weight: 91.1 kg (200 lb 13.4 oz)     Physical Exam    General.  Middle-aged gentleman.  Alert and oriented x3.  No apparent pain/distress/diaphoresis.  Normal mood and affect.  Eyes.  Pupils equal round and reactive.  Intact extraocular musculature.  No pallor or jaundice.  Normal conjunctive and lids.    Oral cavity.  There are moist mucous membrane.  Neck.  Supple.  No JVD.  No lymphadenopathy or thyromegaly.  Cardiovascular.  Regular rate and rhythm.  No gallops or murmurs.  Chest.  Clear to auscultation bilaterally with no added sounds.  Abdomen.  Soft lax.  No tenderness.  No organomegaly.  No guarding or rebound.  Normal bowel sounds.  Mildly distended.  Extremities.  No clubbing cyanosis or edema.  CNS.  No acute focal neurological deficits.    Results Review:      Results from last 7 days   Lab Units 22  0828 22  0108 22  0844 22  2208   SODIUM mmol/L 138  136 136 137   POTASSIUM mmol/L 3.8 3.6 4.1 3.7   CHLORIDE mmol/L 103 104 103 104   CO2 mmol/L 23.5 22.0 22.7 23.5   BUN mg/dL 7 6 8 11   CREATININE mg/dL 0.86 0.89 0.84 0.97   GLUCOSE mg/dL 108* 90 101* 120*   CALCIUM mg/dL 8.7 8.4* 9.0 9.0   AST (SGOT) U/L 712* 582* 567* 562*   ALT (SGPT) U/L 875* 716* 756* 743*     Estimated Creatinine Clearance: 129.1 mL/min (by C-G formula based on SCr of 0.86 mg/dL).  Results from last 7 days   Lab Units 03/02/22 0844   HEMOGLOBIN A1C % 5.80*         Results from last 7 days   Lab Units 03/01/22 2208   TROPONIN T ng/mL <0.010                   Invalid input(s):  PHOS        Invalid input(s): LDLCALC  Results from last 7 days   Lab Units 03/04/22 0828 03/03/22 0108 03/03/22 0108 03/02/22  0844 03/02/22  0844 03/01/22 2208 03/01/22 2208   WBC 10*3/mm3 3.09*  --  3.52  --  3.50  --  4.48   HEMOGLOBIN g/dL 14.1  --  13.5  --  13.8  --  14.0   HEMATOCRIT % 42.9  --  40.5  --  41.9  --  41.5   PLATELETS 10*3/mm3 116*  --  136*  --  118*  --  130*   MCV fL 91.3   < > 90.2   < > 89.3   < > 88.5   MCH pg 30.0   < > 30.1   < > 29.4   < > 29.9   MCHC g/dL 32.9   < > 33.3   < > 32.9   < > 33.7   RDW % 13.7   < > 13.6   < > 13.2   < > 13.0   RDW-SD fl 46.6   < > 44.5   < > 42.8   < > 41.7   MPV fL 12.1*   < > 12.1*   < > 11.8   < > 11.9   NEUTROPHIL % %  --   --   --   --  36.3*   < > 31.9*   LYMPHOCYTE % %  --   --   --   --  43.7   < > 50.7*   MONOCYTES % %  --   --   --   --  18.3*   < > 15.4*   EOSINOPHIL % %  --   --   --   --  1.1   < > 1.6   BASOPHIL % %  --   --   --   --  0.3   < > 0.2   NEUTROS ABS 10*3/mm3 1.40*  --   --   --  1.27*   < > 1.43*   LYMPHS ABS 10*3/mm3  --   --   --   --  1.53   < > 2.27   MONOS ABS 10*3/mm3  --   --   --   --  0.64   < > 0.69   EOS ABS 10*3/mm3 0.07  --   --   --  0.04   < > 0.07   BASOS ABS 10*3/mm3  --   --   --   --  0.01   < > 0.01    < > = values in this interval not displayed.     Results from last 7 days   Lab Units 03/04/22  0865  03/03/22  0108 03/02/22  0844 03/01/22  2343   INR  0.99 1.02 1.01 1.00             Results from last 7 days   Lab Units 03/01/22  2208   CRP mg/dL 1.73*         Results from last 7 days   Lab Units 03/03/22  0108 03/02/22  1927   BLOODCX  No growth at 24 hours No growth at 24 hours     Results from last 7 days   Lab Units 03/02/22  0657   ADENOVIRUS  Not Detected     Results from last 7 days   Lab Units 03/01/22  2231   NITRITE UA  Negative           Imaging:  Imaging Results (Last 24 Hours)     ** No results found for the last 24 hours. **             I reviewed the patient's new clinical results / labs / tests / procedures      Assessment/Plan     Active Hospital Problems    Diagnosis  POA   • **Transaminitis [R74.01]  Yes   • Essential hypertension [I10]  Yes   • Hyperglycemia [R73.9]  Yes   • E coli enteritis [A04.4]  Yes   • Thrombocytopenia (HCC) [D69.6]  Yes   • History of viral hepatitis [Z86.19]  Yes      Resolved Hospital Problems   No resolved problems to display.           · Acute hepatitis AB infection/E. coli enteritis/transaminitis.  CT scan of the abdomen pelvis is normal except nonobstructing left kidney stone.  GI PCR with E. coli.  Improving temperature curve..  Negative blood cultures till now.  Continues with escalating elevation of the liver function test.  INR is normal.  Elevated alpha-fetoprotein.  GI consultation has done a full hepatitis work-up and results are pending.. .  HIV is negative.  On Levaquin for E. coli enteritis.  Patient reports he is not sure whether he has had blood transfusions before.  He denies any IV drug use.  States that he has gotten blood work up in his [place]  Wife counseled to test for hepatitis B.    · Thrombocytopenia leukopenia.  No bleeding diathesis.  Mostly secondary to viral infection.  Will observe.  · Glucose intolerance.  A1c 5.8.  Diet at discharge.  · Hypertension.  No evidence of angina or congestive heart failure.  Improving blood  pressure on Norvasc.  Also on as needed hydralazine.    · VTE prophylaxis with sequential compression devices.    · Discussed with patient/wife..      Bhavesh Man MD  [facility]  03/04/22  16:09 EST

## 2022-03-04 NOTE — PROGRESS NOTES
[facility]  Inpatient Progress Note    Reason for Follow Up:  Hepatitis    Subjective     Interval History:   Slight worsening in his transaminases.  Says he feels better.  Tolerating diet.    Current Facility-Administered Medications:   •  acetaminophen (TYLENOL) tablet 650 mg, 650 mg, Oral, Q4H PRN, 650 mg at 03/02/22 1829 **OR** acetaminophen (TYLENOL) 160 MG/5ML solution 650 mg, 650 mg, Oral, Q4H PRN **OR** acetaminophen (TYLENOL) suppository 650 mg, 650 mg, Rectal, Q4H PRN, Misty Dooley APRN  •  amLODIPine (NORVASC) tablet 5 mg, 5 mg, Oral, Q24H, Bhavesh Man MD, 5 mg at 03/04/22 0836  •  docusate sodium (COLACE) capsule 100 mg, 100 mg, Oral, BID, Misty Dooley APRN, 100 mg at 03/04/22 0837  •  hydrALAZINE (APRESOLINE) injection 10 mg, 10 mg, Intravenous, Q8H PRN, Bhavesh Man MD  •  hydrOXYzine (ATARAX) tablet 25 mg, 25 mg, Oral, TID PRN, Donald Ramires MD, 25 mg at 03/04/22 0533  •  ibuprofen (ADVIL,MOTRIN) tablet 600 mg, 600 mg, Oral, Q4H PRN, Torres Jansen, APRN, 600 mg at 03/04/22 1043  •  levoFLOXacin (LEVAQUIN) tablet 750 mg, 750 mg, Oral, Q24H, Bhavesh aMn MD, 750 mg at 03/04/22 0836  •  melatonin tablet 5 mg, 5 mg, Oral, Nightly PRN, Misty Dooley APRN, 5 mg at 03/04/22 0029  •  morphine injection 2 mg, 2 mg, Intravenous, Q4H PRN, Misty Dooley APRN, 2 mg at 03/02/22 2102  •  ondansetron (ZOFRAN) injection 4 mg, 4 mg, Intravenous, Q6H PRN, Misty Dooley APRN  •  [COMPLETED] Insert peripheral IV, , , Once **AND** sodium chloride 0.9 % flush 10 mL, 10 mL, Intravenous, PRN, Joel Perez PA  •  sodium chloride 0.9 % flush 10 mL, 10 mL, Intravenous, Q12H, Misty Dooley APRN, 10 mL at 03/04/22 0837  •  sodium chloride 0.9 % flush 10 mL, 10 mL, Intravenous, PRN, Misty Dooley APRN  •  sodium chloride 0.9 % infusion, 100 mL/hr, Intravenous, Continuous, Misty Dooley APRN, Last Rate: 100 mL/hr  at 03/04/22 1353, 100 mL/hr at 03/04/22 1353  Review of Systems:     The following systems were reviewed and negative: Constitution, pulmonary, cardiac, gastrointestinal, genitourinary    Objective     Vital Signs  Temp:  [98.4 °F (36.9 °C)-99.1 °F (37.3 °C)] 98.4 °F (36.9 °C)  Heart Rate:  [65-95] 95  Resp:  [16] 16  BP: (119-156)/() 135/92  Body mass index is 32.42 kg/m².    Intake/Output Summary (Last 24 hours) at 3/4/2022 1504  Last data filed at 3/4/2022 0526  Gross per 24 hour   Intake 880 ml   Output --   Net 880 ml     No intake/output data recorded.     Physical Exam:   General: patient awake, alert and cooperative   Eyes: Normal lids and lashes, no scleral icterus   Neck: supple, normal ROM   Skin: warm and dry, not jaundiced   Cardiovascular: regular rhythm and rate, no murmurs auscultated   Pulm: clear to auscultation bilaterally, regular and unlabored   Abdomen: soft, nontender, nondistended; normal bowel sounds   Rectal: deferred   Extremities: no rash or edema   Psychiatric: Normal mood and behavior; memory intact     Results Review:     I reviewed the patient's new clinical results.    Results from last 7 days   Lab Units 03/04/22 0828 03/03/22 0108 03/02/22  0844   WBC 10*3/mm3 3.09* 3.52 3.50   HEMOGLOBIN g/dL 14.1 13.5 13.8   HEMATOCRIT % 42.9 40.5 41.9   PLATELETS 10*3/mm3 116* 136* 118*     Results from last 7 days   Lab Units 03/04/22 0828 03/03/22 0108 03/02/22  0844   SODIUM mmol/L 138 136 136   POTASSIUM mmol/L 3.8 3.6 4.1   CHLORIDE mmol/L 103 104 103   CO2 mmol/L 23.5 22.0 22.7   BUN mg/dL 7 6 8   CREATININE mg/dL 0.86 0.89 0.84   CALCIUM mg/dL 8.7 8.4* 9.0   BILIRUBIN mg/dL 1.8* 1.7* 1.5*   ALK PHOS U/L 182* 161* 182*   ALT (SGPT) U/L 875* 716* 756*   AST (SGOT) U/L 712* 582* 567*   GLUCOSE mg/dL 108* 90 101*     Results from last 7 days   Lab Units 03/04/22  0828 03/03/22  0108 03/02/22  0844   INR  0.99 1.02 1.01     No results found for: LIPASE    Radiology:  CT Abdomen  Pelvis With Contrast   Final Result   1. Normal CT appearance of the liver.   2. Bilateral renal cysts   3. Tiny nonobstructing left kidney stone       Radiation dose reduction techniques were utilized, including automated   exposure control and exposure modulation based on body size.       This report was finalized on 3/2/2022 12:58 PM by Dr. Nasir Davis M.D.          XR Chest 1 View   Final Result   1. No acute process.       This report was finalized on 3/1/2022 10:22 PM by Dr. Man Kim M.D.              Assessment/Plan     Patient Active Problem List   Diagnosis   • Transaminitis   • Thrombocytopenia (HCC)   • History of viral hepatitis   • Essential hypertension   • Hyperglycemia   • E coli enteritis       Impression  1.  Elevated liver enzymes: Hepatocellular pattern.  Secondary to active hepatitis B infection    2.  Hepatitis B infection: Viral load over 41 million.  E antigen and antibody are pending.      3.  Thrombocytopenia    4.  Viral syndrome: With #2    5.  Elevated AFP level: Possibly artificially elevated in the setting of acute hepatitis but worrisome nonetheless given chronic hepatitis B    Plan  MRI of the abdomen to assess the AFP level; discussed with him  It is unclear if this is progression of a chronic hepatitis B infection versus a new acute infection given the high viral load and his viral prodrome.  The confounder is that he reports a history of hepatitis many years ago for which he got treatment.  This is certainly not hepatitis C.  We will check a total hepatitis a antibody level; acute hepatitis A has been ruled out.    In the setting of an acute hepatitis B infection, he gets a number of weeks to try to clear it before treatment is determined    However, if this appears to be more of a chronic infection, i.e. hepatitis B E antigen negative, then he would warrant treatment    Follow-up remaining hepatitis B serologies    He will need to follow-up with me in the  outpatient setting to determine treatment        I discussed the patients findings and my recommendations with patient and family.    All necessary PPE, including face mask and eye protection, were worn during this encounter.  Hand sanitization was performed both before and after the patient interaction.    Over 25 minutes was spent reviewing the patient's chart and records, in discussion with the patient, in examination of the patient, and in discussion with members of the patient's medical team.    Amelia Moon MD

## 2022-03-05 LAB
ALBUMIN SERPL-MCNC: 3.9 G/DL (ref 3.5–5.2)
ALBUMIN/GLOB SERPL: 1 G/DL
ALP SERPL-CCNC: 190 U/L (ref 39–117)
ALT SERPL W P-5'-P-CCNC: 948 U/L (ref 1–41)
ANION GAP SERPL CALCULATED.3IONS-SCNC: 12 MMOL/L (ref 5–15)
AST SERPL-CCNC: 751 U/L (ref 1–40)
BILIRUB SERPL-MCNC: 1.7 MG/DL (ref 0–1.2)
BUN SERPL-MCNC: 8 MG/DL (ref 6–20)
BUN/CREAT SERPL: 9.2 (ref 7–25)
CALCIUM SPEC-SCNC: 9.3 MG/DL (ref 8.6–10.5)
CHLORIDE SERPL-SCNC: 102 MMOL/L (ref 98–107)
CO2 SERPL-SCNC: 24 MMOL/L (ref 22–29)
CREAT SERPL-MCNC: 0.87 MG/DL (ref 0.76–1.27)
DEPRECATED RDW RBC AUTO: 45.2 FL (ref 37–54)
EGFRCR SERPLBLD CKD-EPI 2021: 116.8 ML/MIN/1.73
EOSINOPHIL # BLD MANUAL: 0.07 10*3/MM3 (ref 0–0.4)
EOSINOPHIL NFR BLD MANUAL: 2 % (ref 0.3–6.2)
ERYTHROCYTE [DISTWIDTH] IN BLOOD BY AUTOMATED COUNT: 13.5 % (ref 12.3–15.4)
GLOBULIN UR ELPH-MCNC: 3.8 GM/DL
GLUCOSE SERPL-MCNC: 83 MG/DL (ref 65–99)
HBV E AB SERPL QL IA: NEGATIVE
HBV E AG SERPL QL IA: POSITIVE
HBV SURFACE AG SERPL QL IA: NORMAL
HBV SURFACE AG SERPL QL NT: POSITIVE
HCT VFR BLD AUTO: 43.2 % (ref 37.5–51)
HGB BLD-MCNC: 14.3 G/DL (ref 13–17.7)
INR PPP: 0.99 (ref 0.9–1.1)
LYMPHOCYTES # BLD MANUAL: 1.79 10*3/MM3 (ref 0.7–3.1)
LYMPHOCYTES NFR BLD MANUAL: 13 % (ref 5–12)
MCH RBC QN AUTO: 29.9 PG (ref 26.6–33)
MCHC RBC AUTO-ENTMCNC: 33.1 G/DL (ref 31.5–35.7)
MCV RBC AUTO: 90.2 FL (ref 79–97)
MONOCYTES # BLD: 0.43 10*3/MM3 (ref 0.1–0.9)
NEUTROPHILS # BLD AUTO: 1.03 10*3/MM3 (ref 1.7–7)
NEUTROPHILS NFR BLD MANUAL: 31 % (ref 42.7–76)
PLAT MORPH BLD: NORMAL
PLATELET # BLD AUTO: 124 10*3/MM3 (ref 140–450)
PMV BLD AUTO: 12.2 FL (ref 6–12)
POTASSIUM SERPL-SCNC: 3.9 MMOL/L (ref 3.5–5.2)
PROT SERPL-MCNC: 7.7 G/DL (ref 6–8.5)
PROTHROMBIN TIME: 13 SECONDS (ref 11.7–14.2)
RBC # BLD AUTO: 4.79 10*6/MM3 (ref 4.14–5.8)
RBC MORPH BLD: NORMAL
SODIUM SERPL-SCNC: 138 MMOL/L (ref 136–145)
VARIANT LYMPHS NFR BLD MANUAL: 54 % (ref 19.6–45.3)
WBC MORPH BLD: NORMAL
WBC NRBC COR # BLD: 3.31 10*3/MM3 (ref 3.4–10.8)

## 2022-03-05 PROCEDURE — 80053 COMPREHEN METABOLIC PANEL: CPT | Performed by: INTERNAL MEDICINE

## 2022-03-05 PROCEDURE — 85610 PROTHROMBIN TIME: CPT | Performed by: INTERNAL MEDICINE

## 2022-03-05 PROCEDURE — G0378 HOSPITAL OBSERVATION PER HR: HCPCS

## 2022-03-05 PROCEDURE — 99214 OFFICE O/P EST MOD 30 MIN: CPT | Performed by: INTERNAL MEDICINE

## 2022-03-05 PROCEDURE — 86708 HEPATITIS A ANTIBODY: CPT | Performed by: INTERNAL MEDICINE

## 2022-03-05 PROCEDURE — 85007 BL SMEAR W/DIFF WBC COUNT: CPT | Performed by: INTERNAL MEDICINE

## 2022-03-05 PROCEDURE — 85025 COMPLETE CBC W/AUTO DIFF WBC: CPT | Performed by: INTERNAL MEDICINE

## 2022-03-05 RX ADMIN — IBUPROFEN 600 MG: 600 TABLET, FILM COATED ORAL at 18:40

## 2022-03-05 RX ADMIN — DOCUSATE SODIUM 100 MG: 100 CAPSULE, LIQUID FILLED ORAL at 21:37

## 2022-03-05 RX ADMIN — SODIUM CHLORIDE 100 ML/HR: 9 INJECTION, SOLUTION INTRAVENOUS at 09:26

## 2022-03-05 RX ADMIN — DOCUSATE SODIUM 100 MG: 100 CAPSULE, LIQUID FILLED ORAL at 09:26

## 2022-03-05 RX ADMIN — Medication 5 MG: at 21:37

## 2022-03-05 RX ADMIN — LEVOFLOXACIN 750 MG: 750 TABLET, FILM COATED ORAL at 09:25

## 2022-03-05 RX ADMIN — AMLODIPINE BESYLATE 5 MG: 5 TABLET ORAL at 09:25

## 2022-03-05 RX ADMIN — Medication 10 ML: at 21:37

## 2022-03-05 NOTE — PLAN OF CARE
Goal Outcome Evaluation:  Plan of Care Reviewed With: patient        Progress: no change   Patient is alert and oriented times four. Up to the restroom independently. Ibuprofen given once for complaints of right sided pain. Will continue to monitor

## 2022-03-05 NOTE — PLAN OF CARE
Goal Outcome Evaluation:  Plan of Care Reviewed With: patient        Progress: improving  Outcome Evaluation: pt. was pleasant and cooperative today. Wife was in bed with him most of the day and then pt. got up and sat on window seat the rest of the day. He walked some around his room and appeared restless. He spooke to his kids on the phone and states he is ready to go home. wating on test results for liver still. Pt. ate well and had no complaints of discomfort or pain.

## 2022-03-05 NOTE — PROGRESS NOTES
Name: Zeeshan Gardner ADMIT: 3/1/2022   : 1990  PCP: Bertha Valencia APRN    MRN: [id number] LOS: 0 days   AGE/SEX: 31 y.o. male  ROOM: [facility]     Subjective   Subjective   Patient reports no complaints.  The patient denies any fever or chills.  No nausea or vomiting.  Tolerating regular diet.  Normal bowel movement without constipation/diarrhea/bleeding per rectum/melena..  No abdominal pain.    Review of Systems    .  No dysuria or hematuria.  Cardiovascular/respiratory.  No chest pain/no shortness of breath/no cough/no wheeze/no hemoptysis.         Objective   Objective   Vital Signs  Temp:  [98.6 °F (37 °C)-99.8 °F (37.7 °C)] 99.8 °F (37.7 °C)  Heart Rate:  [72-82] 80  Resp:  [16-18] 16  BP: (111-128)/(73-85) 128/85  SpO2:  [96 %-98 %] 96 %  on   ;   Device (Oxygen Therapy): room air    Intake/Output Summary (Last 24 hours) at 3/5/2022 1828  Last data filed at 3/5/2022 0857  Gross per 24 hour   Intake 1820 ml   Output 550 ml   Net 1270 ml     Body mass index is 32.42 kg/m².      22  0053   Weight: 91.1 kg (200 lb 13.4 oz)     Physical Exam    General.  Middle-aged gentleman.  Alert and oriented x3.  No apparent pain/distress/diaphoresis.  Normal mood and affect.  Eyes.  Pupils equal round and reactive.  Intact extraocular musculature.  No pallor or jaundice.  Normal conjunctive and lids.    Oral cavity.  There are moist mucous membrane.  Neck.  Supple.  No JVD.  No lymphadenopathy or thyromegaly.  Cardiovascular.  Regular rate and rhythm.  No gallops or murmurs.  Chest.  Clear to auscultation bilaterally with no added sounds.  Abdomen.  Soft lax.  No tenderness.  No organomegaly.  No guarding or rebound.  Normal bowel sounds.  Mildly distended.  Extremities.  No clubbing cyanosis or edema.  CNS.  No acute focal neurological deficits.    Results Review:      Results from last 7 days   Lab Units 22  0737 22  0828 22  0108 22  0844 22  2208   SODIUM mmol/L 138 138  136 136 137   POTASSIUM mmol/L 3.9 3.8 3.6 4.1 3.7   CHLORIDE mmol/L 102 103 104 103 104   CO2 mmol/L 24.0 23.5 22.0 22.7 23.5   BUN mg/dL 8 7 6 8 11   CREATININE mg/dL 0.87 0.86 0.89 0.84 0.97   GLUCOSE mg/dL 83 108* 90 101* 120*   CALCIUM mg/dL 9.3 8.7 8.4* 9.0 9.0   AST (SGOT) U/L 751* 712* 582* 567* 562*   ALT (SGPT) U/L 948* 875* 716* 756* 743*     Estimated Creatinine Clearance: 127.6 mL/min (by C-G formula based on SCr of 0.87 mg/dL).  Results from last 7 days   Lab Units 03/02/22  0844   HEMOGLOBIN A1C % 5.80*         Results from last 7 days   Lab Units 03/01/22 2208   TROPONIN T ng/mL <0.010                   Invalid input(s):  PHOS        Invalid input(s): LDLCALC  Results from last 7 days   Lab Units 03/05/22  0737 03/04/22  0828 03/03/22  0108 03/03/22  0108 03/02/22  0844 03/01/22  2208   WBC 10*3/mm3 3.31* 3.09*  --  3.52 3.50 4.48   HEMOGLOBIN g/dL 14.3 14.1  --  13.5 13.8 14.0   HEMATOCRIT % 43.2 42.9  --  40.5 41.9 41.5   PLATELETS 10*3/mm3 124* 116*  --  136* 118* 130*   MCV fL 90.2 91.3  --  90.2 89.3 88.5   MCH pg 29.9 30.0  --  30.1 29.4 29.9   MCHC g/dL 33.1 32.9  --  33.3 32.9 33.7   RDW % 13.5 13.7  --  13.6 13.2 13.0   RDW-SD fl 45.2 46.6  --  44.5 42.8 41.7   MPV fL 12.2* 12.1*  --  12.1* 11.8 11.9   NEUTROPHIL % %  --   --   --   --  36.3* 31.9*   LYMPHOCYTE % %  --   --   --   --  43.7 50.7*   MONOCYTES % %  --   --   --   --  18.3* 15.4*   EOSINOPHIL % %  --   --   --   --  1.1 1.6   BASOPHIL % %  --   --   --   --  0.3 0.2   NEUTROS ABS 10*3/mm3 1.03* 1.40*   < >  --  1.27* 1.43*   LYMPHS ABS 10*3/mm3  --   --   --   --  1.53 2.27   MONOS ABS 10*3/mm3  --   --   --   --  0.64 0.69   EOS ABS 10*3/mm3 0.07 0.07   < >  --  0.04 0.07   BASOS ABS 10*3/mm3  --   --   --   --  0.01 0.01    < > = values in this interval not displayed.     Results from last 7 days   Lab Units 03/05/22  0737 03/04/22  0828 03/03/22  0108 03/02/22  0844 03/01/22  2343   INR  0.99 0.99 1.02 1.01 1.00              Results from last 7 days   Lab Units 03/01/22  2208   CRP mg/dL 1.73*         Results from last 7 days   Lab Units 03/03/22  0108 03/02/22  1927   BLOODCX  No growth at 2 days No growth at 2 days     Results from last 7 days   Lab Units 03/02/22  0657   ADENOVIRUS  Not Detected     Results from last 7 days   Lab Units 03/01/22  2231   NITRITE UA  Negative           Imaging:  Imaging Results (Last 24 Hours)     ** No results found for the last 24 hours. **             I reviewed the patient's new clinical results / labs / tests / procedures      Assessment/Plan     Active Hospital Problems    Diagnosis  POA   • **Transaminitis [R74.01]  Yes   • Essential hypertension [I10]  Yes   • Hyperglycemia [R73.9]  Yes   • E coli enteritis [A04.4]  Yes   • Thrombocytopenia (HCC) [D69.6]  Yes   • History of viral hepatitis [Z86.19]  Yes      Resolved Hospital Problems   No resolved problems to display.           · Acute hepatitis B infection/E. coli enteritis/transaminitis.  CT scan of the abdomen pelvis is normal except nonobstructing left kidney stone.  GI PCR with E. coli.  Improving temperature curve..  Negative blood cultures till now.  Continues with escalating elevation of the liver function test.  INR is normal.  Elevated alpha-fetoprotein.  GI checking MRI of the liver..  HIV is negative.  On Levaquin for E. coli enteritis.  Patient reports he is not sure whether he has had blood transfusions before.  He denies any IV drug use.  States that he has gotten blood work up in his home country [place]  Wife counseled to test for hepatitis B.    · Thrombocytopenia leukopenia.  No bleeding diathesis.  Mostly secondary to viral infection.  Stable will observe.  · Glucose intolerance.  A1c 5.8.  Diet at discharge.  · Hypertension.  No evidence of angina or congestive heart failure.  Improving blood pressure on Norvasc.  Also on as needed hydralazine.    · VTE prophylaxis with sequential compression devices.    · Discussed  with patient/wife..      Bhavesh Man MD  [facility]  03/05/22  18:28 EST

## 2022-03-05 NOTE — PROGRESS NOTES
[facility]  Inpatient Progress Note    Reason for Follow Up: Hepatitis    Subjective     Interval History:   No complaints this morning no right upper quadrant pain, denies jaundice    Current Facility-Administered Medications:   •  acetaminophen (TYLENOL) tablet 650 mg, 650 mg, Oral, Q4H PRN, 650 mg at 03/02/22 1829 **OR** acetaminophen (TYLENOL) 160 MG/5ML solution 650 mg, 650 mg, Oral, Q4H PRN **OR** acetaminophen (TYLENOL) suppository 650 mg, 650 mg, Rectal, Q4H PRN, Misty Dooley APRN  •  amLODIPine (NORVASC) tablet 5 mg, 5 mg, Oral, Q24H, Bhavesh Man MD, 5 mg at 03/05/22 0925  •  docusate sodium (COLACE) capsule 100 mg, 100 mg, Oral, BID, Mitsy Dooley APRN, 100 mg at 03/05/22 0926  •  hydrALAZINE (APRESOLINE) injection 10 mg, 10 mg, Intravenous, Q8H PRN, Bhavesh Man MD  •  hydrOXYzine (ATARAX) tablet 25 mg, 25 mg, Oral, TID PRN, Donald Ramires MD, 25 mg at 03/04/22 0533  •  ibuprofen (ADVIL,MOTRIN) tablet 600 mg, 600 mg, Oral, Q4H PRN, Torres Jansen, APRN, 600 mg at 03/04/22 2120  •  levoFLOXacin (LEVAQUIN) tablet 750 mg, 750 mg, Oral, Q24H, Bhavesh Man MD, 750 mg at 03/05/22 0925  •  melatonin tablet 5 mg, 5 mg, Oral, Nightly PRN, Misty Dooley APRN, 5 mg at 03/04/22 2119  •  morphine injection 2 mg, 2 mg, Intravenous, Q4H PRN, Misty Dooley APRN, 2 mg at 03/02/22 2102  •  ondansetron (ZOFRAN) injection 4 mg, 4 mg, Intravenous, Q6H PRN, Misty Dooley APRN  •  [COMPLETED] Insert peripheral IV, , , Once **AND** sodium chloride 0.9 % flush 10 mL, 10 mL, Intravenous, PRN, Joel Perez PA  •  sodium chloride 0.9 % flush 10 mL, 10 mL, Intravenous, Q12H, Misty Dooley APRN, 10 mL at 03/04/22 2123  •  sodium chloride 0.9 % flush 10 mL, 10 mL, Intravenous, PRN, Misty Dooley APRN  •  sodium chloride 0.9 % infusion, 100 mL/hr, Intravenous, Continuous, Misty Dooley APRN, Last Rate: 100 mL/hr at  03/05/22 0926, 100 mL/hr at 03/05/22 0926  Review of Systems:    There is weakness of fatigue all other systems reviewed and negative    Objective     Vital Signs  Temp:  [98.4 °F (36.9 °C)-99.1 °F (37.3 °C)] 98.6 °F (37 °C)  Heart Rate:  [72-95] 72  Resp:  [16] 16  BP: (111-135)/(73-92) 121/80  Body mass index is 32.42 kg/m².    Intake/Output Summary (Last 24 hours) at 3/5/2022 1109  Last data filed at 3/5/2022 0857  Gross per 24 hour   Intake 2520 ml   Output 550 ml   Net 1970 ml     I/O this shift:  In: 360 [P.O.:360]  Out: -      Physical Exam:   General: patient awake, alert and cooperative   Eyes: Normal lids and lashes, no scleral icterus   Neck: supple, normal ROM   Skin: warm and dry, not jaundiced   Cardiovascular: regular rhythm and rate, no murmurs auscultated   Pulm: clear to auscultation bilaterally, regular and unlabored   Abdomen: soft, nontender, nondistended; normal bowel sounds   Extremities: no rash or edema   Psychiatric: Normal mood and behavior; memory intact     Results Review:     I reviewed the patient's new clinical results.    Results from last 7 days   Lab Units 03/05/22 0737 03/04/22 0828 03/03/22 0108   WBC 10*3/mm3 3.31* 3.09* 3.52   HEMOGLOBIN g/dL 14.3 14.1 13.5   HEMATOCRIT % 43.2 42.9 40.5   PLATELETS 10*3/mm3 124* 116* 136*     Results from last 7 days   Lab Units 03/05/22 0737 03/04/22  0828 03/03/22  0108   SODIUM mmol/L 138 138 136   POTASSIUM mmol/L 3.9 3.8 3.6   CHLORIDE mmol/L 102 103 104   CO2 mmol/L 24.0 23.5 22.0   BUN mg/dL 8 7 6   CREATININE mg/dL 0.87 0.86 0.89   CALCIUM mg/dL 9.3 8.7 8.4*   BILIRUBIN mg/dL 1.7* 1.8* 1.7*   ALK PHOS U/L 190* 182* 161*   ALT (SGPT) U/L 948* 875* 716*   AST (SGOT) U/L 751* 712* 582*   GLUCOSE mg/dL 83 108* 90     Results from last 7 days   Lab Units 03/05/22  0737 03/04/22  0828 03/03/22  0108   INR  0.99 0.99 1.02     No results found for: LIPASE    Radiology:  CT Abdomen Pelvis With Contrast   Final Result   1. Normal CT  appearance of the liver.   2. Bilateral renal cysts   3. Tiny nonobstructing left kidney stone       Radiation dose reduction techniques were utilized, including automated   exposure control and exposure modulation based on body size.       This report was finalized on 3/2/2022 12:58 PM by Dr. Nasir Davis M.D.          XR Chest 1 View   Final Result   1. No acute process.       This report was finalized on 3/1/2022 10:22 PM by Dr. Man Kim M.D.              Assessment/Plan     Patient Active Problem List   Diagnosis   • Transaminitis   • Thrombocytopenia (HCC)   • History of viral hepatitis   • Essential hypertension   • Hyperglycemia   • E coli enteritis     Impression  1.  Elevated liver enzymes: Hepatocellular pattern.  Secondary to active hepatitis B infection     2.  Hepatitis B infection: Viral load over 41 million.  E antigen positive and antibody  pending.    Hepatitis D pending.     3.  Thrombocytopenia     4.  Viral syndrome: With #2     5.  Elevated AFP level: Possibly artificially elevated in the setting of acute hepatitis but worrisome nonetheless given chronic hepatitis B     Plan  MRI of the abdomen to assess the AFP level; discussed with him  It is unclear if this is progression of a chronic hepatitis B infection versus a new acute infection given the high viral load and his viral prodrome.  The confounder is that he reports a history of hepatitis many years ago for which he got treatment.  This is certainly not hepatitis C.  We will check a total hepatitis a antibody level; acute hepatitis A has been ruled out.  Checking hepatitis d for coinfection.     In the setting of an acute hepatitis B infection, he gets a number of weeks to try to clear it before treatment is determined     However, if this appears to be more of a chronic infection,  then he would warrant treatment.  All labs pending at this time     Follow-up remaining hepatitis B serologies     He will need to follow-up  with me in the outpatient setting to determine treatment    Message Sent to our office yesterday and he will be scheduled in our hepatitis B/C clinic with Dr. Moon in the future    I discussed the patients findings and my recommendations with patient and nursing staff.    Johnny Zuñiga MD

## 2022-03-06 LAB
ALBUMIN SERPL-MCNC: 3.8 G/DL (ref 3.5–5.2)
ALBUMIN/GLOB SERPL: 1 G/DL
ALP SERPL-CCNC: 190 U/L (ref 39–117)
ALT SERPL W P-5'-P-CCNC: 847 U/L (ref 1–41)
ANION GAP SERPL CALCULATED.3IONS-SCNC: 9 MMOL/L (ref 5–15)
AST SERPL-CCNC: 695 U/L (ref 1–40)
BILIRUB SERPL-MCNC: 1.6 MG/DL (ref 0–1.2)
BUN SERPL-MCNC: 9 MG/DL (ref 6–20)
BUN/CREAT SERPL: 11.1 (ref 7–25)
CALCIUM SPEC-SCNC: 9.1 MG/DL (ref 8.6–10.5)
CHLORIDE SERPL-SCNC: 104 MMOL/L (ref 98–107)
CO2 SERPL-SCNC: 25 MMOL/L (ref 22–29)
CREAT SERPL-MCNC: 0.81 MG/DL (ref 0.76–1.27)
DEPRECATED RDW RBC AUTO: 43.8 FL (ref 37–54)
EGFRCR SERPLBLD CKD-EPI 2021: 119.4 ML/MIN/1.73
ERYTHROCYTE [DISTWIDTH] IN BLOOD BY AUTOMATED COUNT: 13.6 % (ref 12.3–15.4)
GLOBULIN UR ELPH-MCNC: 3.8 GM/DL
GLUCOSE SERPL-MCNC: 85 MG/DL (ref 65–99)
HAV AB SER QL IA: POSITIVE
HCT VFR BLD AUTO: 41.3 % (ref 37.5–51)
HGB BLD-MCNC: 13.9 G/DL (ref 13–17.7)
INR PPP: 0.95 (ref 0.9–1.1)
LYMPHOCYTES # BLD MANUAL: 2.35 10*3/MM3 (ref 0.7–3.1)
LYMPHOCYTES NFR BLD MANUAL: 9 % (ref 5–12)
MCH RBC QN AUTO: 29.8 PG (ref 26.6–33)
MCHC RBC AUTO-ENTMCNC: 33.7 G/DL (ref 31.5–35.7)
MCV RBC AUTO: 88.6 FL (ref 79–97)
MONOCYTES # BLD: 0.32 10*3/MM3 (ref 0.1–0.9)
NEUTROPHILS # BLD AUTO: 0.94 10*3/MM3 (ref 1.7–7)
NEUTROPHILS NFR BLD MANUAL: 26 % (ref 42.7–76)
NRBC BLD AUTO-RTO: 0 /100 WBC (ref 0–0.2)
PLAT MORPH BLD: NORMAL
PLATELET # BLD AUTO: 159 10*3/MM3 (ref 140–450)
PMV BLD AUTO: 12.3 FL (ref 6–12)
POTASSIUM SERPL-SCNC: 4.1 MMOL/L (ref 3.5–5.2)
PROT SERPL-MCNC: 7.6 G/DL (ref 6–8.5)
PROTHROMBIN TIME: 12.6 SECONDS (ref 11.7–14.2)
RBC # BLD AUTO: 4.66 10*6/MM3 (ref 4.14–5.8)
RBC MORPH BLD: NORMAL
SODIUM SERPL-SCNC: 138 MMOL/L (ref 136–145)
VARIANT LYMPHS NFR BLD MANUAL: 1 % (ref 0–5)
VARIANT LYMPHS NFR BLD MANUAL: 64 % (ref 19.6–45.3)
WBC MORPH BLD: NORMAL
WBC NRBC COR # BLD: 3.61 10*3/MM3 (ref 3.4–10.8)

## 2022-03-06 PROCEDURE — 99214 OFFICE O/P EST MOD 30 MIN: CPT | Performed by: INTERNAL MEDICINE

## 2022-03-06 PROCEDURE — 85025 COMPLETE CBC W/AUTO DIFF WBC: CPT | Performed by: INTERNAL MEDICINE

## 2022-03-06 PROCEDURE — G0378 HOSPITAL OBSERVATION PER HR: HCPCS

## 2022-03-06 PROCEDURE — 80053 COMPREHEN METABOLIC PANEL: CPT | Performed by: INTERNAL MEDICINE

## 2022-03-06 PROCEDURE — 85007 BL SMEAR W/DIFF WBC COUNT: CPT | Performed by: INTERNAL MEDICINE

## 2022-03-06 PROCEDURE — 85610 PROTHROMBIN TIME: CPT | Performed by: INTERNAL MEDICINE

## 2022-03-06 RX ADMIN — LEVOFLOXACIN 750 MG: 750 TABLET, FILM COATED ORAL at 09:43

## 2022-03-06 RX ADMIN — AMLODIPINE BESYLATE 5 MG: 5 TABLET ORAL at 09:43

## 2022-03-06 RX ADMIN — DOCUSATE SODIUM 100 MG: 100 CAPSULE, LIQUID FILLED ORAL at 09:43

## 2022-03-06 RX ADMIN — Medication 10 ML: at 21:32

## 2022-03-06 RX ADMIN — ACETAMINOPHEN 650 MG: 325 TABLET, FILM COATED ORAL at 17:38

## 2022-03-06 RX ADMIN — Medication 10 ML: at 09:43

## 2022-03-06 RX ADMIN — DOCUSATE SODIUM 100 MG: 100 CAPSULE, LIQUID FILLED ORAL at 21:32

## 2022-03-06 NOTE — PROGRESS NOTES
[facility]  Inpatient Progress Note    Reason for Follow Up: Elevated liver tests    Subjective     Interval History:   No complaints today.  No confusion or memory loss.  No signs of encephalopathy.    Current Facility-Administered Medications:   •  acetaminophen (TYLENOL) tablet 650 mg, 650 mg, Oral, Q4H PRN, 650 mg at 03/02/22 1829 **OR** acetaminophen (TYLENOL) 160 MG/5ML solution 650 mg, 650 mg, Oral, Q4H PRN **OR** acetaminophen (TYLENOL) suppository 650 mg, 650 mg, Rectal, Q4H PRN, Misty Dooley APRN  •  amLODIPine (NORVASC) tablet 5 mg, 5 mg, Oral, Q24H, Bhavesh Man MD, 5 mg at 03/06/22 0943  •  docusate sodium (COLACE) capsule 100 mg, 100 mg, Oral, BID, Misty Dooley APRN, 100 mg at 03/06/22 0943  •  hydrALAZINE (APRESOLINE) injection 10 mg, 10 mg, Intravenous, Q8H PRN, Bhavesh Man MD  •  hydrOXYzine (ATARAX) tablet 25 mg, 25 mg, Oral, TID PRN, Donald Ramires MD, 25 mg at 03/04/22 0533  •  ibuprofen (ADVIL,MOTRIN) tablet 600 mg, 600 mg, Oral, Q4H PRN, Torres Jansen, APRN, 600 mg at 03/05/22 1840  •  levoFLOXacin (LEVAQUIN) tablet 750 mg, 750 mg, Oral, Q24H, Bhavesh Man MD, 750 mg at 03/06/22 0943  •  melatonin tablet 5 mg, 5 mg, Oral, Nightly PRN, Misty Dooley APRN, 5 mg at 03/05/22 2137  •  morphine injection 2 mg, 2 mg, Intravenous, Q4H PRN, Misty Dooley APRN, 2 mg at 03/02/22 2102  •  ondansetron (ZOFRAN) injection 4 mg, 4 mg, Intravenous, Q6H PRN, Misty Dooley APRN  •  [COMPLETED] Insert peripheral IV, , , Once **AND** sodium chloride 0.9 % flush 10 mL, 10 mL, Intravenous, PRN, Joel Perez PA  •  sodium chloride 0.9 % flush 10 mL, 10 mL, Intravenous, Q12H, Misty Dooley, APRN, 10 mL at 03/06/22 0943  •  sodium chloride 0.9 % flush 10 mL, 10 mL, Intravenous, PRN, Misty Dooley, APRN  Review of Systems:    There is weakness fatigue all other systems reviewed and negative    Objective      Vital Signs  Temp:  [98.4 °F (36.9 °C)-99.8 °F (37.7 °C)] 99.3 °F (37.4 °C)  Heart Rate:  [64-86] 86  Resp:  [16] 16  BP: (110-146)/() 146/100  Body mass index is 32.42 kg/m².  No intake or output data in the 24 hours ending 03/06/22 1612  No intake/output data recorded.     Physical Exam:   General: patient awake, alert and cooperative   Eyes: Normal lids and lashes, no scleral icterus   Neck: supple, normal ROM   Skin: warm and dry, not jaundiced   Cardiovascular: regular rhythm and rate, no murmurs auscultated   Pulm: clear to auscultation bilaterally, regular and unlabored   Abdomen: soft, nontender, nondistended; normal bowel sounds   Extremities: no rash or edema NO ASTERIXIS   Psychiatric: Normal mood and behavior; memory intact     Results Review:     I reviewed the patient's new clinical results.    Results from last 7 days   Lab Units 03/06/22  0646 03/05/22  0737 03/04/22  0828   WBC 10*3/mm3 3.61 3.31* 3.09*   HEMOGLOBIN g/dL 13.9 14.3 14.1   HEMATOCRIT % 41.3 43.2 42.9   PLATELETS 10*3/mm3 159 124* 116*     Results from last 7 days   Lab Units 03/06/22  0722 03/05/22  0737 03/04/22  0828   SODIUM mmol/L 138 138 138   POTASSIUM mmol/L 4.1 3.9 3.8   CHLORIDE mmol/L 104 102 103   CO2 mmol/L 25.0 24.0 23.5   BUN mg/dL 9 8 7   CREATININE mg/dL 0.81 0.87 0.86   CALCIUM mg/dL 9.1 9.3 8.7   BILIRUBIN mg/dL 1.6* 1.7* 1.8*   ALK PHOS U/L 190* 190* 182*   ALT (SGPT) U/L 847* 948* 875*   AST (SGOT) U/L 695* 751* 712*   GLUCOSE mg/dL 85 83 108*     Results from last 7 days   Lab Units 03/06/22  0646 03/05/22  0737 03/04/22  0828   INR  0.95 0.99 0.99     No results found for: LIPASE    Radiology:  CT Abdomen Pelvis With Contrast   Final Result   1. Normal CT appearance of the liver.   2. Bilateral renal cysts   3. Tiny nonobstructing left kidney stone       Radiation dose reduction techniques were utilized, including automated   exposure control and exposure modulation based on body size.       This report  was finalized on 3/2/2022 12:58 PM by Dr. Nasir Davis M.D.          XR Chest 1 View   Final Result   1. No acute process.       This report was finalized on 3/1/2022 10:22 PM by Dr. Man Kim M.D.          MRI Abdomen With & Without Contrast    (Results Pending)       Assessment/Plan     Patient Active Problem List   Diagnosis   • Transaminitis   • Thrombocytopenia (HCC)   • History of viral hepatitis   • Essential hypertension   • Hyperglycemia   • E coli enteritis          Impression  1.  Elevated liver enzymes: Hepatocellular pattern.  Secondary to active hepatitis B infection     2.  Hepatitis B infection: Viral load over 41 million.  E antigen positive and antibody  pending.    Hepatitis D pending.     3.  Thrombocytopenia     4.  Viral syndrome: With #2     5.  Elevated AFP level: Possibly artificially elevated in the setting of acute hepatitis but worrisome nonetheless given chronic hepatitis B     Plan  MRI of the abdomen to assess the AFP level; discussed with him, did not get done all weekend  It is unclear if this is progression of a chronic hepatitis B infection versus a new acute infection given the high viral load and his viral prodrome.  The confounder is that he reports a history of hepatitis many years ago for which he got treatment.  This is certainly not hepatitis C.  We will check a total hepatitis a antibody level; acute hepatitis A has been ruled out.  Checking hepatitis d for coinfection.     In the setting of an acute hepatitis B infection, he gets a number of weeks to try to clear it before treatment is determined     However, if this appears to be more of a chronic infection,  then he would warrant treatment.  All labs pending at this time     Follow-up remaining hepatitis B serologies     He will need to follow-up with me in the outpatient setting to determine treatment     Message Sent to our office yesterday and he will be scheduled in our hepatitis B/C clinic with   Sarai in the future    No evidence of fulminant failure      I discussed the patients findings and my recommendations with patient and nursing staff.    Johnny Zuñiga MD

## 2022-03-06 NOTE — PROGRESS NOTES
Name: Zeeshan Gardner ADMIT: 3/1/2022   : 1990  PCP: Bertha Valencia APRN    MRN: [id number] LOS: 0 days   AGE/SEX: 31 y.o. male  ROOM: [facility]     Subjective   Subjective   Patient reports no complaints.  The patient denies any fever or chills.  No nausea or vomiting.  Tolerating regular diet.  Normal bowel movement without constipation/diarrhea/bleeding per rectum/melena..  No abdominal pain.    Review of Systems    .  No dysuria or hematuria.  Cardiovascular/respiratory.  No chest pain/no shortness of breath/no cough/no wheeze/no hemoptysis.         Objective   Objective   Vital Signs  Temp:  [98.4 °F (36.9 °C)-99.8 °F (37.7 °C)] 99.3 °F (37.4 °C)  Heart Rate:  [64-86] 86  Resp:  [16] 16  BP: (110-146)/() 146/100  SpO2:  [96 %-98 %] 97 %  on   ;   Device (Oxygen Therapy): room air  No intake or output data in the 24 hours ending 22 1645  Body mass index is 32.42 kg/m².      22  0053   Weight: 91.1 kg (200 lb 13.4 oz)     Physical Exam    General.  Middle-aged gentleman.  Alert and oriented x3.  No apparent pain/distress/diaphoresis.  Normal mood and affect.  Eyes.  Pupils equal round and reactive.  Intact extraocular musculature.  No pallor or jaundice.  Normal conjunctive and lids.    Oral cavity.  There are moist mucous membrane.  Neck.  Supple.  No JVD.  No lymphadenopathy or thyromegaly.  Cardiovascular.  Regular rate and rhythm.  No gallops or murmurs.  Chest.  Clear to auscultation bilaterally with no added sounds.  Abdomen.  Soft lax.  No tenderness.  No organomegaly.  No guarding or rebound.  Normal bowel sounds.  Mildly distended.  Extremities.  No clubbing cyanosis or edema.  CNS.  No acute focal neurological deficits.    Results Review:      Results from last 7 days   Lab Units 22  0722 22  0737 22  0828 22  0108 22  0844 22  2208   SODIUM mmol/L 138 138 138 136 136 137   POTASSIUM mmol/L 4.1 3.9 3.8 3.6 4.1 3.7   CHLORIDE mmol/L 104  102 103 104 103 104   CO2 mmol/L 25.0 24.0 23.5 22.0 22.7 23.5   BUN mg/dL 9 8 7 6 8 11   CREATININE mg/dL 0.81 0.87 0.86 0.89 0.84 0.97   GLUCOSE mg/dL 85 83 108* 90 101* 120*   CALCIUM mg/dL 9.1 9.3 8.7 8.4* 9.0 9.0   AST (SGOT) U/L 695* 751* 712* 582* 567* 562*   ALT (SGPT) U/L 847* 948* 875* 716* 756* 743*     Estimated Creatinine Clearance: 137.1 mL/min (by C-G formula based on SCr of 0.81 mg/dL).  Results from last 7 days   Lab Units 03/02/22  0844   HEMOGLOBIN A1C % 5.80*         Results from last 7 days   Lab Units 03/01/22  2208   TROPONIN T ng/mL <0.010                   Invalid input(s):  PHOS        Invalid input(s): LDLCALC  Results from last 7 days   Lab Units 03/06/22  0646 03/05/22  0737 03/04/22  0828 03/03/22  0108 03/03/22  0108 03/02/22  0844 03/01/22  2208   WBC 10*3/mm3 3.61 3.31* 3.09*  --  3.52 3.50 4.48   HEMOGLOBIN g/dL 13.9 14.3 14.1  --  13.5 13.8 14.0   HEMATOCRIT % 41.3 43.2 42.9  --  40.5 41.9 41.5   PLATELETS 10*3/mm3 159 124* 116*  --  136* 118* 130*   MCV fL 88.6 90.2 91.3  --  90.2 89.3 88.5   MCH pg 29.8 29.9 30.0  --  30.1 29.4 29.9   MCHC g/dL 33.7 33.1 32.9  --  33.3 32.9 33.7   RDW % 13.6 13.5 13.7  --  13.6 13.2 13.0   RDW-SD fl 43.8 45.2 46.6  --  44.5 42.8 41.7   MPV fL 12.3* 12.2* 12.1*  --  12.1* 11.8 11.9   NEUTROPHIL % %  --   --   --   --   --  36.3* 31.9*   LYMPHOCYTE % %  --   --   --   --   --  43.7 50.7*   MONOCYTES % %  --   --   --   --   --  18.3* 15.4*   EOSINOPHIL % %  --   --   --   --   --  1.1 1.6   BASOPHIL % %  --   --   --   --   --  0.3 0.2   NEUTROS ABS 10*3/mm3 0.94* 1.03* 1.40*   < >  --  1.27* 1.43*   LYMPHS ABS 10*3/mm3  --   --   --   --   --  1.53 2.27   MONOS ABS 10*3/mm3  --   --   --   --   --  0.64 0.69   EOS ABS 10*3/mm3  --  0.07 0.07   < >  --  0.04 0.07   BASOS ABS 10*3/mm3  --   --   --   --   --  0.01 0.01   NRBC /100 WBC 0.0  --   --   --   --   --   --     < > = values in this interval not displayed.     Results from last 7 days   Lab  Units 03/06/22  0646 03/05/22  0737 03/04/22  0828 03/03/22  0108 03/02/22  0844 03/01/22  2343   INR  0.95 0.99 0.99 1.02 1.01 1.00             Results from last 7 days   Lab Units 03/01/22  2208   CRP mg/dL 1.73*         Results from last 7 days   Lab Units 03/03/22  0108 03/02/22  1927   BLOODCX  No growth at 3 days No growth at 3 days     Results from last 7 days   Lab Units 03/02/22  0657   ADENOVIRUS  Not Detected     Results from last 7 days   Lab Units 03/01/22  2231   NITRITE UA  Negative           Imaging:  Imaging Results (Last 24 Hours)     ** No results found for the last 24 hours. **             I reviewed the patient's new clinical results / labs / tests / procedures      Assessment/Plan     Active Hospital Problems    Diagnosis  POA   • **Transaminitis [R74.01]  Yes   • Essential hypertension [I10]  Yes   • Hyperglycemia [R73.9]  Yes   • E coli enteritis [A04.4]  Yes   • Thrombocytopenia (HCC) [D69.6]  Yes   • History of viral hepatitis [Z86.19]  Yes      Resolved Hospital Problems   No resolved problems to display.           · Acute hepatitis B infection/E. coli enteritis/transaminitis.  CT scan of the abdomen pelvis is normal except nonobstructing left kidney stone.  GI PCR with E. coli.  Improving temperature curve..  Negative blood cultures till now.  Liver function tests starting to trend down.  INR is normal.  Elevated alpha-fetoprotein.  Awaiting MRI of the liver..  HIV is negative.  On Levaquin for E. coli enteritis.  Patient reports he is not sure whether he has had blood transfusions before.  He denies any IV drug use.  States that he has gotten blood work up in his home country ([facility]  Wife counseled to test for hepatitis B.    · Thrombocytopenia/leukopenia.  Both resolved.  No bleeding diathesis.  Mostly secondary to viral infection.  Stable will observe.  · Glucose intolerance.  A1c 5.8.  Diet at discharge.  · Hypertension.  No evidence of angina or congestive heart failure.   Improving blood pressure on Norvasc.  Also on as needed hydralazine.    · VTE prophylaxis with sequential compression devices.    · Discussed with patient.  · Disposition.  Hopefully home tomorrow if continues to be asymptomatic and if liver function test continues to improve and MRI is negative.      Bhavesh Man MD  [facility]  03/06/22  16:45 EST

## 2022-03-06 NOTE — PLAN OF CARE
Goal Outcome Evaluation:  Plan of Care Reviewed With: patient        Progress: improving   Patient is alert and oriented times four. He ambulates in his room independently. His gait is steady. No complaints of pain or discomfort this shift. Will continue to monitor.

## 2022-03-07 ENCOUNTER — APPOINTMENT (OUTPATIENT)
Dept: MRI IMAGING | Facility: HOSPITAL | Age: 32
End: 2022-03-07

## 2022-03-07 ENCOUNTER — READMISSION MANAGEMENT (OUTPATIENT)
Dept: CALL CENTER | Facility: HOSPITAL | Age: 32
End: 2022-03-07

## 2022-03-07 VITALS
SYSTOLIC BLOOD PRESSURE: 125 MMHG | WEIGHT: 200.84 LBS | HEIGHT: 66 IN | BODY MASS INDEX: 32.28 KG/M2 | TEMPERATURE: 99.1 F | OXYGEN SATURATION: 97 % | HEART RATE: 73 BPM | RESPIRATION RATE: 16 BRPM | DIASTOLIC BLOOD PRESSURE: 78 MMHG

## 2022-03-07 PROBLEM — B19.10 HEPATITIS B INFECTION: Status: ACTIVE | Noted: 2022-03-07

## 2022-03-07 LAB
ALBUMIN SERPL-MCNC: 3.8 G/DL (ref 3.5–5.2)
ALBUMIN/GLOB SERPL: 0.9 G/DL
ALP SERPL-CCNC: 200 U/L (ref 39–117)
ALT SERPL W P-5'-P-CCNC: 838 U/L (ref 1–41)
ANION GAP SERPL CALCULATED.3IONS-SCNC: 9.7 MMOL/L (ref 5–15)
AST SERPL-CCNC: 563 U/L (ref 1–40)
BACTERIA SPEC AEROBE CULT: NORMAL
BILIRUB SERPL-MCNC: 1.6 MG/DL (ref 0–1.2)
BUN SERPL-MCNC: 9 MG/DL (ref 6–20)
BUN/CREAT SERPL: 9.6 (ref 7–25)
CALCIUM SPEC-SCNC: 9.5 MG/DL (ref 8.6–10.5)
CHLORIDE SERPL-SCNC: 101 MMOL/L (ref 98–107)
CO2 SERPL-SCNC: 24.3 MMOL/L (ref 22–29)
CREAT SERPL-MCNC: 0.94 MG/DL (ref 0.76–1.27)
DEPRECATED RDW RBC AUTO: 45.5 FL (ref 37–54)
EGFRCR SERPLBLD CKD-EPI 2021: 109.8 ML/MIN/1.73
ERYTHROCYTE [DISTWIDTH] IN BLOOD BY AUTOMATED COUNT: 13.5 % (ref 12.3–15.4)
GLOBULIN UR ELPH-MCNC: 4.2 GM/DL
GLUCOSE SERPL-MCNC: 134 MG/DL (ref 65–99)
HCT VFR BLD AUTO: 44.5 % (ref 37.5–51)
HGB BLD-MCNC: 14.6 G/DL (ref 13–17.7)
INR PPP: 0.99 (ref 0.9–1.1)
LYMPHOCYTES # BLD MANUAL: 2.68 10*3/MM3 (ref 0.7–3.1)
LYMPHOCYTES NFR BLD MANUAL: 6.3 % (ref 5–12)
MCH RBC QN AUTO: 30 PG (ref 26.6–33)
MCHC RBC AUTO-ENTMCNC: 32.8 G/DL (ref 31.5–35.7)
MCV RBC AUTO: 91.4 FL (ref 79–97)
MONOCYTES # BLD: 0.27 10*3/MM3 (ref 0.1–0.9)
NEUTROPHILS # BLD AUTO: 1.34 10*3/MM3 (ref 1.7–7)
NEUTROPHILS NFR BLD MANUAL: 31.3 % (ref 42.7–76)
PLAT MORPH BLD: NORMAL
PLATELET # BLD AUTO: 161 10*3/MM3 (ref 140–450)
PMV BLD AUTO: 11.5 FL (ref 6–12)
POTASSIUM SERPL-SCNC: 4.2 MMOL/L (ref 3.5–5.2)
PROT SERPL-MCNC: 8 G/DL (ref 6–8.5)
PROTHROMBIN TIME: 13 SECONDS (ref 11.7–14.2)
RBC # BLD AUTO: 4.87 10*6/MM3 (ref 4.14–5.8)
RBC MORPH BLD: NORMAL
SMUDGE CELLS BLD QL SMEAR: ABNORMAL
SODIUM SERPL-SCNC: 135 MMOL/L (ref 136–145)
VARIANT LYMPHS NFR BLD MANUAL: 62.5 % (ref 19.6–45.3)
WBC NRBC COR # BLD: 4.29 10*3/MM3 (ref 3.4–10.8)

## 2022-03-07 PROCEDURE — A9577 INJ MULTIHANCE: HCPCS | Performed by: INTERNAL MEDICINE

## 2022-03-07 PROCEDURE — 85007 BL SMEAR W/DIFF WBC COUNT: CPT | Performed by: INTERNAL MEDICINE

## 2022-03-07 PROCEDURE — 74183 MRI ABD W/O CNTR FLWD CNTR: CPT

## 2022-03-07 PROCEDURE — G0378 HOSPITAL OBSERVATION PER HR: HCPCS

## 2022-03-07 PROCEDURE — 99213 OFFICE O/P EST LOW 20 MIN: CPT | Performed by: PHYSICIAN ASSISTANT

## 2022-03-07 PROCEDURE — 85025 COMPLETE CBC W/AUTO DIFF WBC: CPT | Performed by: INTERNAL MEDICINE

## 2022-03-07 PROCEDURE — 0 GADOBENATE DIMEGLUMINE 529 MG/ML SOLUTION: Performed by: INTERNAL MEDICINE

## 2022-03-07 PROCEDURE — 80053 COMPREHEN METABOLIC PANEL: CPT | Performed by: INTERNAL MEDICINE

## 2022-03-07 PROCEDURE — 85610 PROTHROMBIN TIME: CPT | Performed by: INTERNAL MEDICINE

## 2022-03-07 RX ORDER — AMLODIPINE BESYLATE 5 MG/1
5 TABLET ORAL
Qty: 30 TABLET | Refills: 0 | Status: SHIPPED | OUTPATIENT
Start: 2022-03-08

## 2022-03-07 RX ORDER — LEVOFLOXACIN 750 MG/1
750 TABLET ORAL
Qty: 2 TABLET | Refills: 0 | Status: SHIPPED | OUTPATIENT
Start: 2022-03-08 | End: 2022-03-10

## 2022-03-07 RX ORDER — IBUPROFEN 600 MG/1
600 TABLET ORAL EVERY 4 HOURS PRN
Start: 2022-03-07

## 2022-03-07 RX ADMIN — LEVOFLOXACIN 750 MG: 750 TABLET, FILM COATED ORAL at 08:56

## 2022-03-07 RX ADMIN — Medication 5 MG: at 00:11

## 2022-03-07 RX ADMIN — GADOBENATE DIMEGLUMINE 19 ML: 529 INJECTION, SOLUTION INTRAVENOUS at 08:15

## 2022-03-07 RX ADMIN — Medication 10 ML: at 08:57

## 2022-03-07 RX ADMIN — DOCUSATE SODIUM 100 MG: 100 CAPSULE, LIQUID FILLED ORAL at 08:56

## 2022-03-07 RX ADMIN — AMLODIPINE BESYLATE 5 MG: 5 TABLET ORAL at 08:56

## 2022-03-07 NOTE — PAYOR COMM NOTE
"PLEASE CANCEL THE INPATIENT AUTH REQUEST FAXED FOR THIS PATIENT  MD CANCELLED INPATIENT ORDER TODAY 3/7/22  ID #[id number]      Zeeshan Celaya (31 y.o. Male)             Date of Birth   1990    Social Security Number       Address   [address] [place], [place] [address]    Home Phone   [phone]    MRN   [id number]       Church   Unknown    Marital Status   Single                            Admission Date   3/1/22    Admission Type   Emergency    Admitting Provider   Ezio Coon MD    Attending Provider   Bhavesh Man MD    Department, Room/Bed   [address], 81/[address]       Discharge Date       Discharge Disposition   Home or Self Care    Discharge Destination                               Attending Provider: Bhavesh Man MD    Allergies: No Known Allergies    Isolation: None   Infection: None   Code Status: CPR   Advance Care Planning Activity    Ht: 167.6 cm (66\")   Wt: 91.1 kg (200 lb 13.4 oz)    Admission Cmt: None   Principal Problem: Transaminitis [R74.01]                 Active Insurance as of 3/1/2022     Primary Coverage     Payor Plan Insurance Group Employer/Plan Group    [employer] CROSS BLUE SHIELD PPO [id number]     Payor Plan Address Payor Plan Phone Number Payor Plan Fax Number Effective Dates    [address] [phone]  8/15/2017 - None Entered    [facility] [address]       Subscriber Name Subscriber Birth Date Member ID       ZEESHAN CELAYA 1990 [id number]                 Emergency Contacts      (Rel.) Home Phone Work Phone Mobile Phone    Tala Aguirre (Significant Other) -- -- [phone]            Discharge Order (From admission, onward)     Start     Ordered    03/07/22 1123  Discharge patient  Once        Expected Discharge Date: 03/07/22    Expected Discharge Time: Afternoon    Discharge Disposition: Home or Self Care    Physician of Record for Attribution - Please select " from Treatment Team: YANIRA SANTA [[id number]]    Review needed by CMO to determine Physician of Record: No       Question Answer Comment   Physician of Record for Attribution - Please select from Treatment Team YANIRA SANTA    Review needed by CMO to determine Physician of Record No        03/07/22 1121

## 2022-03-07 NOTE — DISCHARGE SUMMARY
Patient Name: Zeeshan Gardner  : 1990  MRN: [id number]    Date of Admission: 3/1/2022  Date of Discharge:  3/7/2022  Primary Care Physician: Bertha Valencia APRN      Chief Complaint:   Headache and Rib Pain      Discharge Diagnoses     Active Hospital Problems    Diagnosis  POA   • **Transaminitis [R74.01]  Yes   • Essential hypertension [I10]  Yes   • Hyperglycemia [R73.9]  Yes   • E coli enteritis [A04.4]  Yes   • Thrombocytopenia (HCC) [D69.6]  Yes   • History of viral hepatitis [Z86.19]  Yes      Resolved Hospital Problems   No resolved problems to display.        Hospital Course     Mr. Gardner is a 31 y.o. male with a history of headaches and infectious viral hepatitis  who presented to [facility] initially complaining of generalized body aches, chills.   Please see the admitting history and physical for further details.  He was found to have elevated LFT's and was admitted to the hospital for further evaluation and treatment.  CT was negative for acute liver process. He has remote hx of hepatitis, not sure what type, 15 yrs ago. Stool studies were collected showing EPEC. Blood cultures remained no growth. GI was consulted. Hepatitis workup was initiated. LFT's have started to trend down. MRI abdomen was completed to assess elevated AFP level; imaging did not show evidence of cirrhosis or hepatic lesion. He was started on levaquin for EPEC. Amlodipine was prescribed for HTN. Medically he is stable for discharge and has been cleared by GI w/outpatient follow up with Dr. Moon.  Day of Discharge     Subjective:  Denies pain. Appetite wnl. Denies diarrhea. Agrees with discharge home.    Physical Exam:  Temp:  [98.5 °F (36.9 °C)-99.2 °F (37.3 °C)] 99.1 °F (37.3 °C)  Heart Rate:  [66-92] 73  Resp:  [16] 16  BP: (119-132)/(78-89) 125/78  Body mass index is 32.42 kg/m².  Physical Exam  Vitals and nursing note reviewed.   Constitutional:       General: He is not in acute distress.  HENT:       Head: Normocephalic.      Mouth/Throat:      Mouth: Mucous membranes are moist.   Eyes:      Conjunctiva/sclera: Conjunctivae normal.   Cardiovascular:      Rate and Rhythm: Normal rate and regular rhythm.   Pulmonary:      Effort: Pulmonary effort is normal. No respiratory distress.      Breath sounds: Normal breath sounds.   Abdominal:      General: Bowel sounds are normal.      Palpations: Abdomen is soft.   Musculoskeletal:      Cervical back: Neck supple.      Right lower leg: No edema.      Left lower leg: No edema.   Skin:     General: Skin is warm and dry.   Neurological:      Mental Status: He is alert and oriented to person, place, and time.   Psychiatric:         Mood and Affect: Mood normal.         Behavior: Behavior normal.         Consultants     Consult Orders (all) (From admission, onward)     Start     Ordered    03/02/22 0016  Inpatient Gastroenterology Consult  Once        Specialty:  Gastroenterology  Provider:  Jose Luis Stevens MD    03/02/22 0016 03/01/22 2346  [facility] (on-call MD unless specified) Details  Once        Specialty:  Hospitalist  Provider:  (Not yet assigned)    03/01/22 2345              Procedures     * Surgery not found *      Imaging Results (All)     Procedure Component Value Units Date/Time    MRI Abdomen With & Without Contrast [[id number]] Collected: 03/07/22 0838     Updated: 03/07/22 0838    Narrative:      MRI OF THE LIVER WITH AND WITHOUT CONTRAST     HISTORY: Elevated liver enzymes, left upper quadrant abdominal pain.     TECHNIQUE: MRI of the abdomen was performed utilizing a dedicated liver  protocol. Axial 2-D FIESTA, in and out of phase FSPGR, axial  fat-saturated T2 imaging was acquired. Axial thin section precontrast  and dynamic enhanced postcontrast T1 weighted LAVA imaging was acquired.     COMPARISON: CT dated 03/02/2022.     FINDINGS: The liver demonstrates normal signal intensity. The spleen is  normal in size measuring 9.2 cm in craniocaudal  dimension. There is no  ascites present. Following administration of contrast, no suspicious  hepatic lesion is identified. Hepatic veins are patent. The portal vein  and its branches are patent. The gallbladder is distended with layering  small amount of sludge. No intrahepatic biliary dilatation. The common  bile duct demonstrates normal caliber. The pancreatic duct is not  dilated. The pancreas demonstrates normal signal intensity. Both kidneys  are unremarkable apart from simple cortical cysts bilaterally. Please  note that the inferior pole of the kidneys have been excluded from  imaging.       Impression:      1. There is no MR evidence to suggest cirrhosis or suspicious focal  hepatic lesion.  2. Gallbladder sludge..       CT Abdomen Pelvis With Contrast [[id number]] Collected: 03/02/22 1253     Updated: 03/02/22 1301    Narrative:      CT ABDOMEN AND PELVIS WITH IV CONTRAST     HISTORY: Elevated LFTs     TECHNIQUE: Radiation dose reduction techniques were utilized, including  automated exposure control and exposure modulation based on body size.  Axial images were obtained through the abdomen and pelvis after the  administration of IV contrast. Coronal and sagittal reformatted images  obtained.     COMPARISON: None     FINDINGS:      ABDOMEN:  Lung bases are clear. Liver, gallbladder and spleen are unremarkable.  Bilateral renal cysts. Largest cyst within the right kidney measures 3.8  cm. Largest cyst within the left kidney measures 3.0 cm. There is a tiny  nonobstructing left kidney stone. The adrenal glands and pancreas are  unremarkable. No ascites.     PELVIS:  The bladder is unremarkable. No free fluid is seen in the pelvis but the  colon is unremarkable. The appendix is normal. No acute bony abnormality       Impression:      1. Normal CT appearance of the liver.  2. Bilateral renal cysts  3. Tiny nonobstructing left kidney stone     Radiation dose reduction techniques were utilized, including  automated  exposure control and exposure modulation based on body size.     This report was finalized on 3/2/2022 12:58 PM by Dr. Nasir Davis M.D.       XR Chest 1 View [[id number]] Collected: 03/01/22 2222     Updated: 03/01/22 2226    Narrative:      XR CHEST 1 VW-  03/01/2022     HISTORY: Chest pain.     Heart size is within normal limits. Lungs appear free of acute  infiltrates. Bones and soft tissues are unremarkable.       Impression:      1. No acute process.     This report was finalized on 3/1/2022 10:22 PM by Dr. Man Kim M.D.               Pertinent Labs     Results from last 7 days   Lab Units 03/07/22  0940 03/06/22  0646 03/05/22  0737 03/04/22  0828   WBC 10*3/mm3 4.29 3.61 3.31* 3.09*   HEMOGLOBIN g/dL 14.6 13.9 14.3 14.1   PLATELETS 10*3/mm3 161 159 124* 116*     Results from last 7 days   Lab Units 03/07/22  0940 03/06/22  0722 03/05/22  0737 03/04/22  0828   SODIUM mmol/L 135* 138 138 138   POTASSIUM mmol/L 4.2 4.1 3.9 3.8   CHLORIDE mmol/L 101 104 102 103   CO2 mmol/L 24.3 25.0 24.0 23.5   BUN mg/dL 9 9 8 7   CREATININE mg/dL 0.94 0.81 0.87 0.86   GLUCOSE mg/dL 134* 85 83 108*     Results from last 7 days   Lab Units 03/07/22  0940 03/06/22  0722 03/05/22  0737 03/04/22  0828   ALBUMIN g/dL 3.80 3.80 3.90 3.80   BILIRUBIN mg/dL 1.6* 1.6* 1.7* 1.8*   ALK PHOS U/L 200* 190* 190* 182*   AST (SGOT) U/L 563* 695* 751* 712*   ALT (SGPT) U/L 838* 847* 948* 875*     Results from last 7 days   Lab Units 03/07/22  0940 03/06/22  0722 03/05/22  0737 03/04/22  0828   CALCIUM mg/dL 9.5 9.1 9.3 8.7   ALBUMIN g/dL 3.80 3.80 3.90 3.80       Results from last 7 days   Lab Units 03/01/22 2208   TROPONIN T ng/mL <0.010           Invalid input(s): LDLCALC  Results from last 7 days   Lab Units 03/03/22  0108 03/02/22  1927   BLOODCX  No growth at 4 days No growth at 4 days     Results from last 7 days   Lab Units 03/01/22  2209   COVID19  Not Detected       Test Results Pending at Discharge     Pending  Labs     Order Current Status    Hepatitis Delta Virus In process    Hepatitis E IgG In process    Hepatitis E IgM In process    Blood Culture - Blood, Arm, Left Preliminary result    Blood Culture - Blood, Arm, Left Preliminary result          Discharge Details        Discharge Medications      New Medications      Instructions Start Date   amLODIPine 5 MG tablet  Commonly known as: NORVASC   5 mg, Oral, Every 24 Hours Scheduled   Start Date: March 8, 2022     ibuprofen 600 MG tablet  Commonly known as: ADVIL,MOTRIN   600 mg, Oral, Every 4 Hours PRN      levoFLOXacin 750 MG tablet  Commonly known as: LEVAQUIN   750 mg, Oral, Every 24 Hours Scheduled   Start Date: March 8, 2022        Continue These Medications      Instructions Start Date   meloxicam 7.5 MG tablet  Commonly known as: Mobic   7.5 mg, Oral, Daily      nystatin-triamcinolone 388936-9.1 UNIT/GM-% ointment  Commonly known as: MYCOLOG   Topical, 2 Times Daily, Up to two weeks at a time             No Known Allergies    Discharge Disposition:  Home or Self Care      Discharge Diet:  Diet Order   Procedures   • Diet Regular       Discharge Activity:   Activity Instructions     Activity as Tolerated            CODE STATUS:    Code Status and Medical Interventions:   Ordered at: 03/02/22 0016     Code Status (Patient has no pulse and is not breathing):    CPR (Attempt to Resuscitate)     Medical Interventions (Patient has pulse or is breathing):    Full Support       Future Appointments   Date Time Provider Department Center   4/18/2022  8:15 AM Amelia Moon MD [facility]      Follow-up Information     Amelia Moon MD. Schedule an appointment as soon as possible for a visit in 2 week(s).    Specialty: Gastroenterology  Contact information:  [address] [place] [address]  [phone]             Bertha Valencia APRN. Schedule an appointment as soon as possible for a visit in 1 week(s).    Specialties: Family Medicine, Nurse  Practitioner  Contact information:  [address] [facility] [id number] [phone]                         Time Spent on Discharge:  Greater than 30 minutes      LISA Akhtar  [facility]  03/07/22  14:55 EST

## 2022-03-07 NOTE — PLAN OF CARE
Goal Outcome Evaluation:  Plan of Care Reviewed With: patient        Progress: improving   Patient alert and oriented times four. Up as tolerated in his room and in the [address] ways. Npo since midnight for testing. No complaints of pain or discomfort. VSS will continue to monitor.

## 2022-03-07 NOTE — PROGRESS NOTES
[facility]  Inpatient Progress Note    Reason for Follow-up:  Elevated liver tests     Subjective     Interval History:   Recently returned from radiology.  Tolerating p.o. intake without incident.  No nausea, vomiting, fevers, rigors.    Labs pending.     Current Facility-Administered Medications:   •  acetaminophen (TYLENOL) tablet 650 mg, 650 mg, Oral, Q4H PRN, 650 mg at 03/06/22 1738 **OR** acetaminophen (TYLENOL) 160 MG/5ML solution 650 mg, 650 mg, Oral, Q4H PRN **OR** acetaminophen (TYLENOL) suppository 650 mg, 650 mg, Rectal, Q4H PRN, Misty Dooley APRN  •  amLODIPine (NORVASC) tablet 5 mg, 5 mg, Oral, Q24H, Bhavesh Man MD, 5 mg at 03/06/22 0943  •  docusate sodium (COLACE) capsule 100 mg, 100 mg, Oral, BID, Misty Dooley APRN, 100 mg at 03/06/22 2132  •  hydrALAZINE (APRESOLINE) injection 10 mg, 10 mg, Intravenous, Q8H PRN, Bhavesh Man MD  •  hydrOXYzine (ATARAX) tablet 25 mg, 25 mg, Oral, TID PRN, Donald Ramires MD, 25 mg at 03/04/22 0533  •  ibuprofen (ADVIL,MOTRIN) tablet 600 mg, 600 mg, Oral, Q4H PRN, Torres Jansen APRN, 600 mg at 03/05/22 1840  •  levoFLOXacin (LEVAQUIN) tablet 750 mg, 750 mg, Oral, Q24H, Bhavesh Man MD, 750 mg at 03/06/22 0943  •  melatonin tablet 5 mg, 5 mg, Oral, Nightly PRN, Misty Dooley APRN, 5 mg at 03/07/22 0011  •  morphine injection 2 mg, 2 mg, Intravenous, Q4H PRN, Misty Dooley APRN, 2 mg at 03/02/22 2102  •  ondansetron (ZOFRAN) injection 4 mg, 4 mg, Intravenous, Q6H PRN, Misty Dooley APRN  •  [COMPLETED] Insert peripheral IV, , , Once **AND** sodium chloride 0.9 % flush 10 mL, 10 mL, Intravenous, PRN, Joel Perez PA  •  sodium chloride 0.9 % flush 10 mL, 10 mL, Intravenous, Q12H, Misty Dooley APRN, 10 mL at 03/06/22 2132  •  sodium chloride 0.9 % flush 10 mL, 10 mL, Intravenous, PRN, Misty Dooley, LISA     Review of Systems:    Constitutional: No fevers,  chills, sweats   Respiratory: No shortness of breath, cough   Cardiovascular: No Chest pain, palpitations   Gastrointestinal: No nausea, vomiting, diarrhea   Genitourinary: No hematuria, dysuria    Objective     Vital Signs  Temp:  [98.5 °F (36.9 °C)-99.3 °F (37.4 °C)] 99.1 °F (37.3 °C)  Heart Rate:  [66-92] 73  Resp:  [16] 16  BP: (119-146)/() 125/78  Body mass index is 32.42 kg/m².    Intake/Output Summary (Last 24 hours) at 3/7/2022 0826  Last data filed at 3/6/2022 2041  Gross per 24 hour   Intake 460 ml   Output --   Net 460 ml     No intake/output data recorded.     Physical Exam:   General: Awake, alert and conversive. No acute distress.   Eyes: Normal lids and lashes, no scleral icterus.   Neck: Supple and symmetric. Trachea midline.    Skin: Warm and dry, not jaundiced.    Cardiovascular: Regular rate and rhythm. No murmur.   Pulm: Quiet, even, nonlabored breathing. Clear to auscultation bilaterally.    Abdomen: Soft, nondistended, nontender. No rebound or guarding. Bowel sounds present in all 4 quadrants.   Extremities: No rashes or edema.   Psychiatric: Appropriate mood and affect. Cooperative.     Results Review:     I reviewed the patient's new clinical results.    Results from last 7 days   Lab Units 03/06/22  0646 03/05/22  0737 03/04/22  0828   WBC 10*3/mm3 3.61 3.31* 3.09*   HEMOGLOBIN g/dL 13.9 14.3 14.1   HEMATOCRIT % 41.3 43.2 42.9   PLATELETS 10*3/mm3 159 124* 116*     Results from last 7 days   Lab Units 03/06/22  0722 03/05/22  0737 03/04/22  0828   SODIUM mmol/L 138 138 138   POTASSIUM mmol/L 4.1 3.9 3.8   CHLORIDE mmol/L 104 102 103   CO2 mmol/L 25.0 24.0 23.5   BUN mg/dL 9 8 7   CREATININE mg/dL 0.81 0.87 0.86   CALCIUM mg/dL 9.1 9.3 8.7   BILIRUBIN mg/dL 1.6* 1.7* 1.8*   ALK PHOS U/L 190* 190* 182*   ALT (SGPT) U/L 847* 948* 875*   AST (SGOT) U/L 695* 751* 712*   GLUCOSE mg/dL 85 83 108*     Results from last 7 days   Lab Units 03/06/22  0646 03/05/22  0737 03/04/22  0828   INR  0.95  0.99 0.99     No results found for: LIPASE    Radiology:  CT Abdomen Pelvis With Contrast   Final Result   1. Normal CT appearance of the liver.   2. Bilateral renal cysts   3. Tiny nonobstructing left kidney stone       Radiation dose reduction techniques were utilized, including automated   exposure control and exposure modulation based on body size.       This report was finalized on 3/2/2022 12:58 PM by Dr. Nasir Davis M.D.          XR Chest 1 View   Final Result   1. No acute process.       This report was finalized on 3/1/2022 10:22 PM by Dr. Man Kim M.D.          MRI Abdomen With & Without Contrast    (Results Pending)       Assessment/Plan     Patient Active Problem List   Diagnosis   • Transaminitis   • Thrombocytopenia (HCC)   • History of viral hepatitis   • Essential hypertension   • Hyperglycemia   • E coli enteritis       Assessment:  1. Elevated LFTs secondary to active hepatitis B infection  2. Hepatitis B infection with viral load over 41 million, positive Hep B E antigen.  Hepatitis D and E serologies pending  3. Thrombocytopenia  4. Viral syndrome secondary to #2  5. Elevated AFP; MRI pending.      Plan:  · Await radiology findings.    · Await daily labs and outstanding hepatitis labs.   · Will arrange follow up appointment with Dr. Amelia Moon in the outpatient clinic.    I discussed the patient's findings and my recommendations with patient and Dr. Yarbrough.    Dragon dictation used throughout this note.            KHUSHBU Sheppard  [facility]  [address] [place] [address]  Office: [phone]

## 2022-03-07 NOTE — EXTERNAL PATIENT INSTRUCTIONS
Patient Education   Table of Contents       Levofloxacin tablets     To view videos and all your education online visit,   [email or web address]   or scan this QR code with your smartphone.                  Levofloxacin tablets     What is this medicine?   LEVOFLOXACIN (erasmo lyn ZUNILDA a sin) is a quinolone antibiotic. It is used to treat certain kinds of bacterial infections. It will not work for colds, flu, or other viral infections.   This medicine may be used for other purposes; ask your health care provider or pharmacist if you have questions.   COMMON BRAND NAME(S): Levaquin, Levaquin Leva-Augusto   What should I tell my health care provider before I take this medicine?   They need to know if you have any of these conditions:         bone problems       diabetes       heart disease       high blood pressure       history of irregular heartbeat       history of low levels of potassium in the blood       joint problems       kidney disease       liver disease       mental illness       myasthenia gravis       seizures       tendon problems       tingling of the fingers or toes, or other nerve disorder       an unusual or allergic reaction to levofloxacin, other quinolone antibiotics, foods, dyes, or preservatives       pregnant or trying to get pregnant       breast-feeding     How should I use this medicine?   Take this medicine by mouth with a full glass of water. Follow the directions on the prescription label. You can take it with or without food. If it upsets your stomach, take it with food. Take your medicine at regular intervals. Do not take your medicine more often than directed. Take all of your medicine as directed even if you think you are better. Do not skip doses or stop your medicine early.   Avoid antacids, calcium, iron, and zinc products for 2 hours before and 2 hours after taking a dose of this medicine.   A special MedGuide will be given to you by the pharmacist with each prescription and  refill. Be sure to read this information carefully each time.   Talk to your pediatrician regarding the use of this medicine in children. While this drug may be prescribed for children as young as 6 months for selected conditions, precautions do apply.   Overdosage: If you think you have taken too much of this medicine contact a poison control center or emergency room at once.   NOTE: This medicine is only for you. Do not share this medicine with others.   What if I miss a dose?   If you miss a dose, take it as soon as you remember. If it is almost time for your next dose, take only that dose. Do not take double or extra doses.   What may interact with this medicine?   Do not take this medicine with any of the following medications:         cisapride       dronedarone       pimozide       thioridazine     This medicine may also interact with the following medications:         antacids       birth control pills       certain medicines for diabetes, like glipizide, glyburide, or insulin       certain medicines that treat or prevent blood clots like warfarin       didanosine buffered tablets or powder       multivitamins       NSAIDS, medicines for pain and inflammation, like ibuprofen or naproxen       other medicines that prolong the QT interval (cause an abnormal heart rhythm) like dofetilide, ziprasidone       steroid medicines like prednisone or cortisone       sucralfate       theophylline     This list may not describe all possible interactions. Give your health care provider a list of all the medicines, herbs, non-prescription drugs, or dietary supplements you use. Also tell them if you smoke, drink alcohol, or use illegal drugs. Some items may interact with your medicine.   What should I watch for while using this medicine?   Tell your doctor or healthcare professional if your symptoms do not start to get better or if they get worse.   Do not treat diarrhea with over the counter products. Contact your doctor  if you have diarrhea that lasts more than 2 days or if it is severe and watery.   Check with your doctor or health care professional if you get an attack of severe diarrhea, nausea and vomiting, or if you sweat a lot. The loss of too much body fluid can make it dangerous for you to take this medicine.   This medicine may increase blood sugar. Ask your healthcare provider if changes in diet or medicines are needed if you have diabetes.   You may get drowsy or dizzy. Do not drive, use machinery, or do anything that needs mental alertness until you know how this medicine affects you. Do not sit or stand up quickly, especially if you are an older patient. This reduces the risk of dizzy or fainting spells.   This medicine can make you more sensitive to the sun. Keep out of the sun. If you cannot avoid being in the sun, wear protective clothing and use sunscreen. Do not use sun lamps or tanning beds/booths.   What side effects may I notice from receiving this medicine?   Side effects that you should report to your doctor or health care professional as soon as possible:         allergic reactions like skin rash or hives, swelling of the face, lips, or tongue       anxious       bloody or watery diarrhea       breathing problems       confusion       depressed mood       fast, irregular heartbeat       fever       hallucination, loss of contact with reality       joint, muscle, or tendon pain or swelling       loss of memory       muscle weakness       pain, tingling, numbness in the hands or feet       seizures       signs and symptoms of aortic dissection such as sudden chest, stomach, or back pain       signs and symptoms of high blood sugar such as being more thirsty or hungry or having to urinate more than normal. You may also feel very tired or have blurry vision.       signs and symptoms of liver injury like dark yellow or brown urine; general ill feeling or flu-like symptoms; light-colored stools; loss of appetite;  nausea; right upper belly pain; unusually weak or tired; yellowing of the eyes or skin       signs and symptoms of low blood sugar such as feeling anxious; confusion; dizziness; increased hunger; unusually weak or tired; sweating; shakiness; cold; irritable; headache; blurred vision; fast heartbeat; loss of consciousness; pale skin       suicidal thoughts or other mood changes       sunburn       unusually weak     Side effects that usually do not require medical attention (report to your doctor or health care professional if they continue or are bothersome):         constipation       dry mouth       headache       nausea, vomiting       trouble sleeping     This list may not describe all possible side effects. Call your doctor for medical advice about side effects. You may report side effects to FDA at 6-118-LHA-9906.   Where should I keep my medicine?   Keep out of the reach of children.   Store at room temperature between 15 and 30 degrees C (59 and 86 degrees F). Keep in a tightly closed container. Throw away any unused medicine after the expiration date.   NOTE: This sheet is a summary. It may not cover all possible information. If you have questions about this medicine, talk to your doctor, pharmacist, or health care provider.     ? 2021 Elsevier/Gold Standard (2019-12-09 14:03:14)

## 2022-03-08 ENCOUNTER — TRANSITIONAL CARE MANAGEMENT TELEPHONE ENCOUNTER (OUTPATIENT)
Dept: CALL CENTER | Facility: HOSPITAL | Age: 32
End: 2022-03-08

## 2022-03-08 LAB — BACTERIA SPEC AEROBE CULT: NORMAL

## 2022-03-08 NOTE — OUTREACH NOTE
Call Center TCM Note    Flowsheet Row Responses   [facility] patient discharged from? [facility]   Does the patient have one of the following disease processes/diagnoses(primary or secondary)? Other   TCM attempt successful? Yes   Call start time 1626   Call end time 1627   Discharge diagnosis transaminitis, enteritis, thrombocytopenia   Prescription comments will be picking up his medication later today   Does the patient have a primary care provider?  Yes   Does the patient have an appointment with their PCP within 7 days of discharge? No   Comments regarding PCP says he will call tomorrow to make his f/u appt with PCP   Nursing Interventions Advised patient to make appointment   Has the patient kept scheduled appointments due by today? N/A   Has home health visited the patient within 72 hours of discharge? N/A   Psychosocial issues? No   Did the patient receive a copy of their discharge instructions? Yes   What is the patient's perception of their health status since discharge? Improving   Is the patient/caregiver able to teach back signs and symptoms related to disease process for when to call PCP? Yes   Is the patient/caregiver able to teach back signs and symptoms related to disease process for when to call 911? Yes   Is the patient/caregiver able to teach back the hierarchy of who to call/visit for symptoms/problems? PCP, Specialist, Home health nurse, Urgent Care, ED, 911 Yes   TCM call completed? Yes   Wrap up additional comments Doing well, no questions at this time.          Rupa Gray RN    3/8/2022, 16:28 EST

## 2022-03-08 NOTE — OUTREACH NOTE
Prep Survey    Flowsheet Row Responses   [facility] facility patient discharged from? [facility]   Is LACE score < 7 ? Yes   Emergency Room discharge w/ pulse ox? No   Eligibility [facility]   Date of Admission 03/01/22   Date of Discharge 03/07/22   Discharge Disposition Home or Self Care   Discharge diagnosis transaminitis, enteritis, thrombocytopenia   Does the patient have one of the following disease processes/diagnoses(primary or secondary)? Other   Does the patient have Home health ordered? No   Is there a DME ordered? No   Prep survey completed? Yes          BAYLEE ROMERO - Registered Nurse

## 2022-03-08 NOTE — PROGRESS NOTES
Case Management Discharge Note      Final Note: The patient was discharged to home on 3/7/22. KARLA Nixon RN, CCP.         Selected Continued Care - Discharged on 3/7/2022 Admission date: 3/1/2022 - Discharge disposition: Home or Self Care    Destination    No services have been selected for the patient.              Durable Medical Equipment    No services have been selected for the patient.              Dialysis/Infusion    No services have been selected for the patient.              Home Medical Care    No services have been selected for the patient.              Therapy    No services have been selected for the patient.              Community Resources    No services have been selected for the patient.              Community & DME    No services have been selected for the patient.                  Transportation Services  Private: Car    Final Discharge Disposition Code: 01 - home or self-care

## 2022-03-11 LAB
HEV IGG SER QL IA: NEGATIVE
HEV IGM SER QL: NEGATIVE

## 2022-03-12 LAB — HDV AB SER QL IA: NEGATIVE

## 2022-04-18 ENCOUNTER — OFFICE VISIT (OUTPATIENT)
Dept: GASTROENTEROLOGY | Facility: CLINIC | Age: 32
End: 2022-04-18

## 2022-04-18 VITALS
DIASTOLIC BLOOD PRESSURE: 78 MMHG | HEIGHT: 66 IN | BODY MASS INDEX: 31.02 KG/M2 | WEIGHT: 193 LBS | SYSTOLIC BLOOD PRESSURE: 125 MMHG | TEMPERATURE: 97.1 F

## 2022-04-18 DIAGNOSIS — R74.8 ELEVATED LIVER ENZYMES: Chronic | ICD-10-CM

## 2022-04-18 DIAGNOSIS — B18.1 CHRONIC VIRAL HEPATITIS B WITHOUT DELTA AGENT AND WITHOUT COMA: Primary | Chronic | ICD-10-CM

## 2022-04-18 DIAGNOSIS — R77.2 ELEVATED SERUM ALPHA-FETOPROTEIN LEVEL: ICD-10-CM

## 2022-04-18 PROCEDURE — 99214 OFFICE O/P EST MOD 30 MIN: CPT | Performed by: INTERNAL MEDICINE

## 2022-04-18 RX ORDER — TENOFOVIR DISOPROXIL FUMARATE 300 MG/1
300 TABLET, FILM COATED ORAL DAILY
Qty: 90 TABLET | Refills: 3 | Status: SHIPPED | OUTPATIENT
Start: 2022-04-18

## 2022-04-18 NOTE — PROGRESS NOTES
Chief Complaint   Patient presents with   • Hospital Follow Up Visit       Subjective     RHYS    Zeeshan Gardner is a 31 y.o. male with a past medical history noted below who presents for hospital follow-up.  He was admitted with a viral syndrome at the beginning of March, reported history of prior hepatitis infection, and was found to have an elevated AFP level as well as elevated transaminases.  He was found to be positive for hepatitis B.  He had reported that he had been treated hepatitis in the past but was not clear as to which type of hepatitis he had nor the medication that he was treated with.    His transaminases gradually improved.  His hepatitis serologies slowly returned. He was hepatitis B surface antigen positive, viral load was over 41 million, E antigen positive.  Transaminases were elevated.    He says that overall he has felt well.  Some malaise and fatigue but this has settled down.  He does have a very active job and that impacted him initially.    He does not know if his wife has been vaccinated against hepatitis B.  He has 4 children at home.  He does not know their vaccination status either.    Today's visit was in the office.  Both the patient and I were wearing face masks and proper hand hygiene was performed before and after the physical exam.           Current Outpatient Medications:   •  amLODIPine (NORVASC) 5 MG tablet, Take 1 tablet by mouth Daily., Disp: 30 tablet, Rfl: 0  •  ibuprofen (ADVIL,MOTRIN) 600 MG tablet, Take 1 tablet by mouth Every 4 (Four) Hours As Needed for Mild Pain  or Fever., Disp: , Rfl:   •  meloxicam (Mobic) 7.5 MG tablet, Take 1 tablet by mouth Daily., Disp: 14 tablet, Rfl: 0  •  nystatin-triamcinolone (MYCOLOG) 322912-1.1 UNIT/GM-% ointment, Apply  topically to the appropriate area as directed 2 (Two) Times a Day. Up to two weeks at a time, Disp: 1 bottle, Rfl: 1  •  tenofovir (VIREAD) 300 MG tablet, Take 1 tablet by mouth Daily., Disp: 90 tablet, Rfl:  3      Objective     Vitals:    04/18/22 0817   BP: 125/78   Temp: 97.1 °F (36.2 °C)         04/18/22 0817   Weight: 87.5 kg (193 lb)     Body mass index is 31.15 kg/m².    Physical Exam  Constitutional:       General: He is not in acute distress.  Pulmonary:      Effort: Pulmonary effort is normal.   Neurological:      Mental Status: He is alert and oriented to person, place, and time.   Psychiatric:         Mood and Affect: Mood normal.         Behavior: Behavior normal.         Thought Content: Thought content normal.         Judgment: Judgment normal.             WBC   Date Value Ref Range Status   03/07/2022 4.29 3.40 - 10.80 10*3/mm3 Final   09/11/2020 4.62 3.40 - 10.80 10*3/mm3 Final     RBC   Date Value Ref Range Status   03/07/2022 4.87 4.14 - 5.80 10*6/mm3 Final   09/11/2020 4.74 4.14 - 5.80 10*6/mm3 Final     Hemoglobin   Date Value Ref Range Status   03/07/2022 14.6 13.0 - 17.7 g/dL Final     Hematocrit   Date Value Ref Range Status   03/07/2022 44.5 37.5 - 51.0 % Final     MCV   Date Value Ref Range Status   03/07/2022 91.4 79.0 - 97.0 fL Final     MCH   Date Value Ref Range Status   03/07/2022 30.0 26.6 - 33.0 pg Final     MCHC   Date Value Ref Range Status   03/07/2022 32.8 31.5 - 35.7 g/dL Final     RDW   Date Value Ref Range Status   03/07/2022 13.5 12.3 - 15.4 % Final     RDW-SD   Date Value Ref Range Status   03/07/2022 45.5 37.0 - 54.0 fl Final     MPV   Date Value Ref Range Status   03/07/2022 11.5 6.0 - 12.0 fL Final     Platelets   Date Value Ref Range Status   03/07/2022 161 140 - 450 10*3/mm3 Final     Neutrophil %   Date Value Ref Range Status   03/02/2022 36.3 (L) 42.7 - 76.0 % Final     Lymphocyte %   Date Value Ref Range Status   03/02/2022 43.7 19.6 - 45.3 % Final     Monocyte %   Date Value Ref Range Status   03/02/2022 18.3 (H) 5.0 - 12.0 % Final     Eosinophil %   Date Value Ref Range Status   03/02/2022 1.1 0.3 - 6.2 % Final     Basophil %   Date Value Ref Range Status   03/02/2022  0.3 0.0 - 1.5 % Final     Neutrophils Absolute   Date Value Ref Range Status   03/07/2022 1.34 (L) 1.70 - 7.00 10*3/mm3 Final     Neutrophils, Absolute   Date Value Ref Range Status   03/02/2022 1.27 (L) 1.70 - 7.00 10*3/mm3 Final     Lymphocytes, Absolute   Date Value Ref Range Status   03/02/2022 1.53 0.70 - 3.10 10*3/mm3 Final     Monocytes, Absolute   Date Value Ref Range Status   03/02/2022 0.64 0.10 - 0.90 10*3/mm3 Final     Eosinophils Absolute   Date Value Ref Range Status   03/05/2022 0.07 0.00 - 0.40 10*3/mm3 Final     Eosinophils, Absolute   Date Value Ref Range Status   03/02/2022 0.04 0.00 - 0.40 10*3/mm3 Final     Basophils, Absolute   Date Value Ref Range Status   03/02/2022 0.01 0.00 - 0.20 10*3/mm3 Final     nRBC   Date Value Ref Range Status   03/06/2022 0.0 0.0 - 0.2 /100 WBC Final       Lab Results   Component Value Date    GLUCOSE 134 (H) 03/07/2022    BUN 9 03/07/2022    CREATININE 0.94 03/07/2022    EGFRIFNONA 100 09/11/2020    EGFRIFAFRI 121 09/11/2020    BCR 9.6 03/07/2022    CO2 24.3 03/07/2022    CALCIUM 9.5 03/07/2022    PROTENTOTREF 7.4 09/11/2020    ALBUMIN 3.80 03/07/2022    LABIL2 1.6 09/11/2020     (H) 03/07/2022     (H) 03/07/2022     MRI abd    IMPRESSION:  1. There is no MR evidence to suggest cirrhosis or suspicious focal  hepatic lesion.  2. Gallbladder sludge..     This report was finalized on 3/8/2022 1:28 PM by Dr. Eva Mckeon M.D.    I personally reviewed data as detailed below:     The labs listed above.    The radiology studies listed above.    Office notes from: March 2022 hospitaliziation             Assessment/Plan    1. Chronic viral hepatitis B without delta agent and without coma: Now with active infection    2. Elevated liver enzymes: As above    3. Elevated serum alpha-fetoprotein level: MR imaging without evidence of any focal lesion    Plan  He is E antigen negative with significant elevated transaminases and a significantly elevated viral load  consistent with an active infection.  Will start tenofovir.  We discussed the high chance that this will be a lifelong medication.  We discussed the need for regular labs during this period.  We discussed the need for not missing any doses of this medication.  He verbalizes understanding    I recommended that he encourage his wife to follow-up with her primary care physician for her own testing for hepatitis B as well as his children    We will update labs today and in 3 months once he has been on therapy    Diagnoses and all orders for this visit:    1. Chronic viral hepatitis B without delta agent and without coma (HCC) (Primary)  -     CBC & Differential  -     Comprehensive Metabolic Panel  -     CBC & Differential; Future  -     Comprehensive Metabolic Panel; Future  -     Hepatitis B DNA, Quantitative, PCR; Future    2. Elevated liver enzymes  -     CBC & Differential  -     Comprehensive Metabolic Panel  -     CBC & Differential; Future  -     Comprehensive Metabolic Panel; Future  -     Hepatitis B DNA, Quantitative, PCR; Future    3. Elevated serum alpha-fetoprotein level  -     CBC & Differential  -     Comprehensive Metabolic Panel    Other orders  -     tenofovir (VIREAD) 300 MG tablet; Take 1 tablet by mouth Daily.  Dispense: 90 tablet; Refill: 3        I have discussed the above plan with the patient.  They verbalize understanding and are in agreement with the plan.  They have been advised to contact the office for any questions, concerns, or changes related to their health.    Dictated utilizing Dragon dictation

## 2022-04-19 LAB
ALBUMIN SERPL-MCNC: 4.6 G/DL (ref 4–5)
ALBUMIN/GLOB SERPL: 1.4 {RATIO} (ref 1.2–2.2)
ALP SERPL-CCNC: 110 IU/L (ref 44–121)
ALT SERPL-CCNC: 56 IU/L (ref 0–44)
AST SERPL-CCNC: 36 IU/L (ref 0–40)
BASOPHILS # BLD AUTO: 0 X10E3/UL (ref 0–0.2)
BASOPHILS NFR BLD AUTO: 0 %
BILIRUB SERPL-MCNC: 0.5 MG/DL (ref 0–1.2)
BUN SERPL-MCNC: 13 MG/DL (ref 6–20)
BUN/CREAT SERPL: 14 (ref 9–20)
CALCIUM SERPL-MCNC: 9.6 MG/DL (ref 8.7–10.2)
CHLORIDE SERPL-SCNC: 104 MMOL/L (ref 96–106)
CO2 SERPL-SCNC: 22 MMOL/L (ref 20–29)
CREAT SERPL-MCNC: 0.91 MG/DL (ref 0.76–1.27)
EGFRCR SERPLBLD CKD-EPI 2021: 114 ML/MIN/1.73
EOSINOPHIL # BLD AUTO: 0.1 X10E3/UL (ref 0–0.4)
EOSINOPHIL NFR BLD AUTO: 2 %
ERYTHROCYTE [DISTWIDTH] IN BLOOD BY AUTOMATED COUNT: 13.2 % (ref 11.6–15.4)
GLOBULIN SER CALC-MCNC: 3.4 G/DL (ref 1.5–4.5)
GLUCOSE SERPL-MCNC: 97 MG/DL (ref 65–99)
HCT VFR BLD AUTO: 44.1 % (ref 37.5–51)
HGB BLD-MCNC: 14.5 G/DL (ref 13–17.7)
IMM GRANULOCYTES # BLD AUTO: 0 X10E3/UL (ref 0–0.1)
IMM GRANULOCYTES NFR BLD AUTO: 0 %
LYMPHOCYTES # BLD AUTO: 2.7 X10E3/UL (ref 0.7–3.1)
LYMPHOCYTES NFR BLD AUTO: 56 %
MCH RBC QN AUTO: 29.5 PG (ref 26.6–33)
MCHC RBC AUTO-ENTMCNC: 32.9 G/DL (ref 31.5–35.7)
MCV RBC AUTO: 90 FL (ref 79–97)
MONOCYTES # BLD AUTO: 0.5 X10E3/UL (ref 0.1–0.9)
MONOCYTES NFR BLD AUTO: 10 %
NEUTROPHILS # BLD AUTO: 1.6 X10E3/UL (ref 1.4–7)
NEUTROPHILS NFR BLD AUTO: 32 %
PLATELET # BLD AUTO: 153 X10E3/UL (ref 150–450)
POTASSIUM SERPL-SCNC: 4.3 MMOL/L (ref 3.5–5.2)
PROT SERPL-MCNC: 8 G/DL (ref 6–8.5)
RBC # BLD AUTO: 4.91 X10E6/UL (ref 4.14–5.8)
SODIUM SERPL-SCNC: 141 MMOL/L (ref 134–144)
WBC # BLD AUTO: 4.9 X10E3/UL (ref 3.4–10.8)

## 2022-04-22 ENCOUNTER — TELEPHONE (OUTPATIENT)
Dept: GASTROENTEROLOGY | Facility: CLINIC | Age: 32
End: 2022-04-22

## 2022-04-22 NOTE — TELEPHONE ENCOUNTER
----- Message from Amelia Moon MD sent at 4/22/2022  2:08 PM EDT -----  His CBC is in normal range    His liver enzymes are much improved, though still slightly high

## 2022-04-27 NOTE — TELEPHONE ENCOUNTER
Repeated attempts to contact pt without success.    Letter mailed to pt/s home address with DR Moon' analysis of results.

## 2022-07-22 ENCOUNTER — LAB (OUTPATIENT)
Dept: GASTROENTEROLOGY | Facility: CLINIC | Age: 32
End: 2022-07-22

## 2022-07-25 ENCOUNTER — TELEPHONE (OUTPATIENT)
Dept: GASTROENTEROLOGY | Facility: CLINIC | Age: 32
End: 2022-07-25

## 2022-07-25 NOTE — TELEPHONE ENCOUNTER
----- Message from Amelia Moon MD sent at 7/25/2022  2:46 PM EDT -----  His liver enzymes are improving but still on the elevated side.  He still has low platelets    He needs to be continuing to take the tenofovir and I will follow-up his viral serologies at his upcoming visit in August.